# Patient Record
Sex: FEMALE | Race: WHITE | NOT HISPANIC OR LATINO | Employment: FULL TIME | URBAN - METROPOLITAN AREA
[De-identification: names, ages, dates, MRNs, and addresses within clinical notes are randomized per-mention and may not be internally consistent; named-entity substitution may affect disease eponyms.]

---

## 2024-05-10 ENCOUNTER — NON-PROVIDER VISIT (OUTPATIENT)
Dept: URGENT CARE | Facility: PHYSICIAN GROUP | Age: 29
End: 2024-05-10

## 2024-05-10 ENCOUNTER — APPOINTMENT (OUTPATIENT)
Dept: URGENT CARE | Facility: PHYSICIAN GROUP | Age: 29
End: 2024-05-10

## 2024-05-10 DIAGNOSIS — Z11.1 PPD SCREENING TEST: ICD-10-CM

## 2024-05-10 PROCEDURE — 86580 TB INTRADERMAL TEST: CPT | Performed by: NURSE PRACTITIONER

## 2024-05-11 NOTE — PROGRESS NOTES
Quoc Horan is a 28 y.o. female here for a non-provider visit for PPD placement -- Step 1 of 2    Reason for PPD:  work requirement    1. TB evaluation questionnaire completed by patient? Yes      -  If any answers marked yes did you contact a provider prior to placing? Not Indicated  2.  Patient notified to return to clinic for reading on: After Sunday (5-12-24) 5:48 PM and Before Monday (5-13-24) 5:48 PM.   3.  PPD Placement documentation completed on TB evaluation questionnaire? YES  4.  Location of TB evaluation questionnaire filed: [facility]

## 2024-05-13 ENCOUNTER — NON-PROVIDER VISIT (OUTPATIENT)
Dept: URGENT CARE | Facility: PHYSICIAN GROUP | Age: 29
End: 2024-05-13

## 2024-05-13 LAB — TB WHEAL 3D P 5 TU DIAM: NORMAL MM

## 2024-05-13 PROCEDURE — 99999 PR NO CHARGE: CPT | Performed by: NURSE PRACTITIONER

## 2024-05-14 NOTE — PROGRESS NOTES
Quoc Horan is a 28 y.o. female here for a non-provider visit for PPD reading -- Step 1 of 1.      1.  Resulted in Epic under enter/edit results? Yes   2.  TB evaluation questionnaire scanned into chart and original given to patient?Yes      3. Was induration greater than 0 mm? No.      Routed to PCP? No

## 2024-07-24 ENCOUNTER — APPOINTMENT (OUTPATIENT)
Dept: OBGYN | Facility: CLINIC | Age: 29
End: 2024-07-24
Payer: OTHER GOVERNMENT

## 2024-07-24 ENCOUNTER — HOSPITAL ENCOUNTER (OUTPATIENT)
Facility: MEDICAL CENTER | Age: 29
End: 2024-07-24
Attending: OBSTETRICS & GYNECOLOGY
Payer: OTHER GOVERNMENT

## 2024-07-24 VITALS — DIASTOLIC BLOOD PRESSURE: 65 MMHG | SYSTOLIC BLOOD PRESSURE: 118 MMHG | WEIGHT: 132.5 LBS

## 2024-07-24 DIAGNOSIS — Z32.00 ENCOUNTER FOR PREGNANCY TEST, RESULT UNKNOWN: Primary | ICD-10-CM

## 2024-07-24 DIAGNOSIS — Z34.00 SUPERVISION OF NORMAL FIRST PREGNANCY, ANTEPARTUM: ICD-10-CM

## 2024-07-24 DIAGNOSIS — Z32.00 ENCOUNTER FOR PREGNANCY TEST, RESULT UNKNOWN: ICD-10-CM

## 2024-07-24 LAB
POCT INT CON NEG: NEGATIVE
POCT INT CON POS: POSITIVE
POCT URINE PREGNANCY TEST: POSITIVE

## 2024-07-24 PROCEDURE — 87624 HPV HI-RISK TYP POOLED RSLT: CPT

## 2024-07-24 PROCEDURE — 87591 N.GONORRHOEAE DNA AMP PROB: CPT

## 2024-07-24 PROCEDURE — 76705 ECHO EXAM OF ABDOMEN: CPT | Mod: 26 | Performed by: OBSTETRICS & GYNECOLOGY

## 2024-07-24 PROCEDURE — 87491 CHLMYD TRACH DNA AMP PROBE: CPT

## 2024-07-24 PROCEDURE — 3074F SYST BP LT 130 MM HG: CPT | Performed by: OBSTETRICS & GYNECOLOGY

## 2024-07-24 PROCEDURE — 88175 CYTOPATH C/V AUTO FLUID REDO: CPT

## 2024-07-24 PROCEDURE — 81025 URINE PREGNANCY TEST: CPT | Performed by: OBSTETRICS & GYNECOLOGY

## 2024-07-24 PROCEDURE — 3078F DIAST BP <80 MM HG: CPT | Performed by: OBSTETRICS & GYNECOLOGY

## 2024-07-24 PROCEDURE — 0500F INITIAL PRENATAL CARE VISIT: CPT | Performed by: OBSTETRICS & GYNECOLOGY

## 2024-07-24 ASSESSMENT — EDINBURGH POSTNATAL DEPRESSION SCALE (EPDS)
I HAVE BEEN ANXIOUS OR WORRIED FOR NO GOOD REASON: HARDLY EVER
I HAVE LOOKED FORWARD WITH ENJOYMENT TO THINGS: AS MUCH AS I EVER DID
I HAVE BEEN SO UNHAPPY THAT I HAVE BEEN CRYING: NO, NEVER
I HAVE BLAMED MYSELF UNNECESSARILY WHEN THINGS WENT WRONG: NOT VERY OFTEN
I HAVE FELT SAD OR MISERABLE: NO, NOT AT ALL
THINGS HAVE BEEN GETTING ON TOP OF ME: NO, I HAVE BEEN COPING AS WELL AS EVER
TOTAL SCORE: 2
THE THOUGHT OF HARMING MYSELF HAS OCCURRED TO ME: NEVER
I HAVE FELT SCARED OR PANICKY FOR NO GOOD REASON: NO, NOT AT ALL
I HAVE BEEN SO UNHAPPY THAT I HAVE HAD DIFFICULTY SLEEPING: NOT AT ALL
I HAVE BEEN ABLE TO LAUGH AND SEE THE FUNNY SIDE OF THINGS: AS MUCH AS I ALWAYS COULD

## 2024-07-27 LAB
C TRACH RRNA CVX QL NAA+PROBE: NEGATIVE
CYTOLOGIST CVX/VAG CYTO: NORMAL
CYTOLOGY CVX/VAG DOC CYTO: NORMAL
CYTOLOGY CVX/VAG DOC THIN PREP: NORMAL
HPV I/H RISK 4 DNA CVX QL PROBE+SIG AMP: NEGATIVE
N GONORRHOEA RRNA CVX QL NAA+PROBE: NEGATIVE
NOTE NL11727A: NORMAL
OTHER STN SPEC: NORMAL
STAT OF ADQ CVX/VAG CYTO-IMP: NORMAL

## 2024-08-22 ENCOUNTER — TELEPHONE (OUTPATIENT)
Dept: OBGYN | Facility: CLINIC | Age: 29
End: 2024-08-22
Payer: OTHER GOVERNMENT

## 2024-08-22 NOTE — TELEPHONE ENCOUNTER
Phone Number Called: [phone]     Call outcome: Spoke to patient regarding message below.    Message: Spoke to pt and she states that she will have her labs from 7/24/24 completed prior to her visit on 8/26/24 with Dr. Díaz. Pt was unaware that she needed her labs completed. Thank you.

## 2024-08-24 ENCOUNTER — HOSPITAL ENCOUNTER (OUTPATIENT)
Dept: LAB | Facility: MEDICAL CENTER | Age: 29
End: 2024-08-24
Attending: OBSTETRICS & GYNECOLOGY
Payer: OTHER GOVERNMENT

## 2024-08-24 DIAGNOSIS — Z32.00 ENCOUNTER FOR PREGNANCY TEST, RESULT UNKNOWN: ICD-10-CM

## 2024-08-24 LAB
ABO GROUP BLD: NORMAL
APPEARANCE UR: CLEAR
BASOPHILS # BLD AUTO: 0.4 % (ref 0–1.8)
BASOPHILS # BLD: 0.05 K/UL (ref 0–0.12)
BILIRUB UR QL STRIP.AUTO: NEGATIVE
BLD GP AB SCN SERPL QL: NORMAL
COLOR UR: YELLOW
EOSINOPHIL # BLD AUTO: 0.09 K/UL (ref 0–0.51)
EOSINOPHIL NFR BLD: 0.7 % (ref 0–6.9)
ERYTHROCYTE [DISTWIDTH] IN BLOOD BY AUTOMATED COUNT: 43.5 FL (ref 35.9–50)
GLUCOSE UR STRIP.AUTO-MCNC: NEGATIVE MG/DL
HBV SURFACE AG SER QL: ABNORMAL
HCT VFR BLD AUTO: 43 % (ref 37–47)
HGB BLD-MCNC: 14.6 G/DL (ref 12–16)
HIV 1+2 AB+HIV1 P24 AG SERPL QL IA: NORMAL
IMM GRANULOCYTES # BLD AUTO: 0.09 K/UL (ref 0–0.11)
IMM GRANULOCYTES NFR BLD AUTO: 0.7 % (ref 0–0.9)
KETONES UR STRIP.AUTO-MCNC: ABNORMAL MG/DL
LEUKOCYTE ESTERASE UR QL STRIP.AUTO: NEGATIVE
LYMPHOCYTES # BLD AUTO: 1.51 K/UL (ref 1–4.8)
LYMPHOCYTES NFR BLD: 11.2 % (ref 22–41)
MCH RBC QN AUTO: 30.3 PG (ref 27–33)
MCHC RBC AUTO-ENTMCNC: 34 G/DL (ref 32.2–35.5)
MCV RBC AUTO: 89.2 FL (ref 81.4–97.8)
MICRO URNS: ABNORMAL
MONOCYTES # BLD AUTO: 0.65 K/UL (ref 0–0.85)
MONOCYTES NFR BLD AUTO: 4.8 % (ref 0–13.4)
NEUTROPHILS # BLD AUTO: 11.1 K/UL (ref 1.82–7.42)
NEUTROPHILS NFR BLD: 82.2 % (ref 44–72)
NITRITE UR QL STRIP.AUTO: NEGATIVE
NRBC # BLD AUTO: 0 K/UL
NRBC BLD-RTO: 0 /100 WBC (ref 0–0.2)
PH UR STRIP.AUTO: 8 [PH] (ref 5–8)
PLATELET # BLD AUTO: 319 K/UL (ref 164–446)
PMV BLD AUTO: 9.5 FL (ref 9–12.9)
PROT UR QL STRIP: NEGATIVE MG/DL
RBC # BLD AUTO: 4.82 M/UL (ref 4.2–5.4)
RBC UR QL AUTO: NEGATIVE
RH BLD: NORMAL
RUBV AB SER QL: 95.4 IU/ML
SP GR UR STRIP.AUTO: 1.02
T PALLIDUM AB SER QL IA: ABNORMAL
UROBILINOGEN UR STRIP.AUTO-MCNC: 0.2 MG/DL
WBC # BLD AUTO: 13.5 K/UL (ref 4.8–10.8)

## 2024-08-24 PROCEDURE — 36415 COLL VENOUS BLD VENIPUNCTURE: CPT

## 2024-08-24 PROCEDURE — 86762 RUBELLA ANTIBODY: CPT

## 2024-08-24 PROCEDURE — 86900 BLOOD TYPING SEROLOGIC ABO: CPT

## 2024-08-24 PROCEDURE — 86850 RBC ANTIBODY SCREEN: CPT

## 2024-08-24 PROCEDURE — 86592 SYPHILIS TEST NON-TREP QUAL: CPT

## 2024-08-24 PROCEDURE — 85025 COMPLETE CBC W/AUTO DIFF WBC: CPT

## 2024-08-24 PROCEDURE — 86901 BLOOD TYPING SEROLOGIC RH(D): CPT

## 2024-08-24 PROCEDURE — 80307 DRUG TEST PRSMV CHEM ANLYZR: CPT

## 2024-08-24 PROCEDURE — 81003 URINALYSIS AUTO W/O SCOPE: CPT

## 2024-08-24 PROCEDURE — 86780 TREPONEMA PALLIDUM: CPT

## 2024-08-24 PROCEDURE — 87340 HEPATITIS B SURFACE AG IA: CPT

## 2024-08-24 PROCEDURE — 87389 HIV-1 AG W/HIV-1&-2 AB AG IA: CPT

## 2024-08-26 ENCOUNTER — APPOINTMENT (OUTPATIENT)
Dept: OBGYN | Facility: CLINIC | Age: 29
End: 2024-08-26
Payer: OTHER GOVERNMENT

## 2024-08-26 VITALS — SYSTOLIC BLOOD PRESSURE: 157 MMHG | DIASTOLIC BLOOD PRESSURE: 86 MMHG | WEIGHT: 144.2 LBS

## 2024-08-26 DIAGNOSIS — Z34.92 SECOND TRIMESTER PREGNANCY: ICD-10-CM

## 2024-08-26 LAB
AMPHET CTO UR CFM-MCNC: NEGATIVE NG/ML
BARBITURATES CTO UR CFM-MCNC: NEGATIVE NG/ML
BENZODIAZ CTO UR CFM-MCNC: NEGATIVE NG/ML
CANNABINOIDS CTO UR CFM-MCNC: NEGATIVE NG/ML
COCAINE CTO UR CFM-MCNC: NEGATIVE NG/ML
CREAT UR-MCNC: 96.5 MG/DL (ref 20–400)
DRUG COMMENT 753798: NORMAL
METHADONE CTO UR CFM-MCNC: NEGATIVE NG/ML
OPIATES CTO UR CFM-MCNC: NEGATIVE NG/ML
PCP CTO UR CFM-MCNC: NEGATIVE NG/ML
PROPOXYPH CTO UR CFM-MCNC: NEGATIVE NG/ML

## 2024-08-26 PROCEDURE — 3077F SYST BP >= 140 MM HG: CPT | Performed by: OBSTETRICS & GYNECOLOGY

## 2024-08-26 PROCEDURE — 0502F SUBSEQUENT PRENATAL CARE: CPT | Performed by: OBSTETRICS & GYNECOLOGY

## 2024-08-26 PROCEDURE — 3079F DIAST BP 80-89 MM HG: CPT | Performed by: OBSTETRICS & GYNECOLOGY

## 2024-08-26 NOTE — PROGRESS NOTES
Pt's here for OB follow-up  Reports + fetal movement Not currently  No VB, LOF or UC's.  Confirmed good ph#[phone] ,   pharmacy, drug allergy, and medications on file.  Pt states no complaints for today.

## 2024-09-05 LAB
Lab: NEGATIVE
Lab: NORMAL
NTRA CYSTIC FIBROSIS: NEGATIVE
NTRA DUCHENNE/BECKER MUSCULAR DYSTROPHY: NEGATIVE
NTRA FRAGILE X SYNDROME: NEGATIVE
NTRA SPINAL MUSCULAR ATROPHY: NEGATIVE

## 2024-09-23 ENCOUNTER — APPOINTMENT (OUTPATIENT)
Dept: OBGYN | Facility: CLINIC | Age: 29
End: 2024-09-23
Payer: OTHER GOVERNMENT

## 2024-09-23 VITALS — DIASTOLIC BLOOD PRESSURE: 81 MMHG | WEIGHT: 151.1 LBS | SYSTOLIC BLOOD PRESSURE: 147 MMHG

## 2024-09-23 DIAGNOSIS — O10.919 CHRONIC HYPERTENSION AFFECTING PREGNANCY: ICD-10-CM

## 2024-09-23 PROCEDURE — 0502F SUBSEQUENT PRENATAL CARE: CPT | Performed by: OBSTETRICS & GYNECOLOGY

## 2024-09-23 PROCEDURE — 3077F SYST BP >= 140 MM HG: CPT | Performed by: OBSTETRICS & GYNECOLOGY

## 2024-09-23 PROCEDURE — 3079F DIAST BP 80-89 MM HG: CPT | Performed by: OBSTETRICS & GYNECOLOGY

## 2024-09-23 RX ORDER — ASPIRIN 81 MG/1
81 TABLET, CHEWABLE ORAL DAILY
Qty: 100 TABLET | Refills: 3 | Status: SHIPPED
Start: 2024-09-23 | End: 2024-09-23 | Stop reason: CLARIF

## 2024-09-23 NOTE — PROGRESS NOTES
Pt. Here for OB/FU.   22w1d  Reports Good FM.   Pt. Denies VB, LOF, or UC's.     Pt states no concerns for today.     Phone/Pharm verified.    [phone]

## 2024-09-24 ENCOUNTER — HOSPITAL ENCOUNTER (OUTPATIENT)
Dept: RADIOLOGY | Facility: MEDICAL CENTER | Age: 29
End: 2024-09-24
Attending: OBSTETRICS & GYNECOLOGY
Payer: OTHER GOVERNMENT

## 2024-09-24 DIAGNOSIS — Z32.00 ENCOUNTER FOR PREGNANCY TEST, RESULT UNKNOWN: ICD-10-CM

## 2024-09-24 PROCEDURE — 76805 OB US >/= 14 WKS SNGL FETUS: CPT

## 2024-10-24 ENCOUNTER — ROUTINE PRENATAL (OUTPATIENT)
Dept: OBGYN | Facility: CLINIC | Age: 29
End: 2024-10-24
Payer: OTHER GOVERNMENT

## 2024-10-24 VITALS — DIASTOLIC BLOOD PRESSURE: 78 MMHG | SYSTOLIC BLOOD PRESSURE: 140 MMHG | WEIGHT: 157 LBS

## 2024-10-24 DIAGNOSIS — R03.0 ELEVATED BLOOD PRESSURE READING IN OFFICE WITH WHITE COAT SYNDROME, WITHOUT DIAGNOSIS OF HYPERTENSION: ICD-10-CM

## 2024-10-24 DIAGNOSIS — Z34.00 SUPERVISION OF NORMAL FIRST PREGNANCY, ANTEPARTUM: ICD-10-CM

## 2024-10-24 DIAGNOSIS — R03.0 ELEVATED BLOOD PRESSURE READING IN OFFICE WITHOUT DIAGNOSIS OF HYPERTENSION: ICD-10-CM

## 2024-10-24 PROCEDURE — 3078F DIAST BP <80 MM HG: CPT | Performed by: MIDWIFE

## 2024-10-24 PROCEDURE — 0502F SUBSEQUENT PRENATAL CARE: CPT | Performed by: MIDWIFE

## 2024-10-24 PROCEDURE — 3077F SYST BP >= 140 MM HG: CPT | Performed by: MIDWIFE

## 2024-10-29 ENCOUNTER — HOSPITAL ENCOUNTER (OUTPATIENT)
Dept: RADIOLOGY | Facility: MEDICAL CENTER | Age: 29
End: 2024-10-29
Attending: MIDWIFE
Payer: OTHER GOVERNMENT

## 2024-10-29 DIAGNOSIS — Z34.00 SUPERVISION OF NORMAL FIRST PREGNANCY, ANTEPARTUM: ICD-10-CM

## 2024-10-29 DIAGNOSIS — R03.0 ELEVATED BLOOD PRESSURE READING IN OFFICE WITH WHITE COAT SYNDROME, WITHOUT DIAGNOSIS OF HYPERTENSION: ICD-10-CM

## 2024-10-29 PROCEDURE — 76816 OB US FOLLOW-UP PER FETUS: CPT

## 2024-11-02 ENCOUNTER — HOSPITAL ENCOUNTER (OUTPATIENT)
Dept: LAB | Facility: MEDICAL CENTER | Age: 29
End: 2024-11-02
Attending: MIDWIFE
Payer: OTHER GOVERNMENT

## 2024-11-02 DIAGNOSIS — R03.0 ELEVATED BLOOD PRESSURE READING IN OFFICE WITH WHITE COAT SYNDROME, WITHOUT DIAGNOSIS OF HYPERTENSION: ICD-10-CM

## 2024-11-02 DIAGNOSIS — Z34.00 SUPERVISION OF NORMAL FIRST PREGNANCY, ANTEPARTUM: ICD-10-CM

## 2024-11-02 LAB
ALBUMIN SERPL BCP-MCNC: 3.3 G/DL (ref 3.2–4.9)
ALBUMIN/GLOB SERPL: 1 G/DL
ALP SERPL-CCNC: 101 U/L (ref 30–99)
ALT SERPL-CCNC: 16 U/L (ref 2–50)
ANION GAP SERPL CALC-SCNC: 12 MMOL/L (ref 7–16)
AST SERPL-CCNC: 18 U/L (ref 12–45)
BILIRUB SERPL-MCNC: 0.2 MG/DL (ref 0.1–1.5)
BUN SERPL-MCNC: 6 MG/DL (ref 8–22)
CALCIUM ALBUM COR SERPL-MCNC: 9.4 MG/DL (ref 8.5–10.5)
CALCIUM SERPL-MCNC: 8.8 MG/DL (ref 8.5–10.5)
CHLORIDE SERPL-SCNC: 103 MMOL/L (ref 96–112)
CO2 SERPL-SCNC: 20 MMOL/L (ref 20–33)
CREAT SERPL-MCNC: 0.42 MG/DL (ref 0.5–1.4)
CREAT UR-MCNC: 64.75 MG/DL
ERYTHROCYTE [DISTWIDTH] IN BLOOD BY AUTOMATED COUNT: 41.1 FL (ref 35.9–50)
GFR SERPLBLD CREATININE-BSD FMLA CKD-EPI: 135 ML/MIN/1.73 M 2
GLOBULIN SER CALC-MCNC: 3.4 G/DL (ref 1.9–3.5)
GLUCOSE 1H P 50 G GLC PO SERPL-MCNC: 127 MG/DL (ref 70–139)
GLUCOSE SERPL-MCNC: 86 MG/DL (ref 65–99)
HCT VFR BLD AUTO: 37.6 % (ref 37–47)
HGB BLD-MCNC: 12.6 G/DL (ref 12–16)
MCH RBC QN AUTO: 29.9 PG (ref 27–33)
MCHC RBC AUTO-ENTMCNC: 33.5 G/DL (ref 32.2–35.5)
MCV RBC AUTO: 89.3 FL (ref 81.4–97.8)
PLATELET # BLD AUTO: 270 K/UL (ref 164–446)
PMV BLD AUTO: 9.8 FL (ref 9–12.9)
POTASSIUM SERPL-SCNC: 3.7 MMOL/L (ref 3.6–5.5)
PROT SERPL-MCNC: 6.7 G/DL (ref 6–8.2)
PROT UR-MCNC: 8 MG/DL (ref 0–15)
PROT/CREAT UR: 124 MG/G (ref 10–107)
RBC # BLD AUTO: 4.21 M/UL (ref 4.2–5.4)
SODIUM SERPL-SCNC: 135 MMOL/L (ref 135–145)
T PALLIDUM AB SER QL IA: NORMAL
WBC # BLD AUTO: 11 K/UL (ref 4.8–10.8)

## 2024-11-02 PROCEDURE — 82950 GLUCOSE TEST: CPT

## 2024-11-02 PROCEDURE — 85027 COMPLETE CBC AUTOMATED: CPT

## 2024-11-02 PROCEDURE — 82570 ASSAY OF URINE CREATININE: CPT

## 2024-11-02 PROCEDURE — 36415 COLL VENOUS BLD VENIPUNCTURE: CPT

## 2024-11-02 PROCEDURE — 86780 TREPONEMA PALLIDUM: CPT

## 2024-11-02 PROCEDURE — 80053 COMPREHEN METABOLIC PANEL: CPT

## 2024-11-02 PROCEDURE — 84156 ASSAY OF PROTEIN URINE: CPT

## 2024-11-08 ENCOUNTER — ROUTINE PRENATAL (OUTPATIENT)
Dept: OBGYN | Facility: CLINIC | Age: 29
End: 2024-11-08
Payer: OTHER GOVERNMENT

## 2024-11-08 VITALS — SYSTOLIC BLOOD PRESSURE: 153 MMHG | WEIGHT: 160.9 LBS | DIASTOLIC BLOOD PRESSURE: 93 MMHG

## 2024-11-08 DIAGNOSIS — R03.0 ELEVATED BLOOD PRESSURE READING IN OFFICE WITHOUT DIAGNOSIS OF HYPERTENSION: ICD-10-CM

## 2024-11-08 DIAGNOSIS — Z34.00 SUPERVISION OF NORMAL FIRST PREGNANCY, ANTEPARTUM: ICD-10-CM

## 2024-11-08 PROCEDURE — 3080F DIAST BP >= 90 MM HG: CPT | Performed by: STUDENT IN AN ORGANIZED HEALTH CARE EDUCATION/TRAINING PROGRAM

## 2024-11-08 PROCEDURE — 0502F SUBSEQUENT PRENATAL CARE: CPT | Performed by: STUDENT IN AN ORGANIZED HEALTH CARE EDUCATION/TRAINING PROGRAM

## 2024-11-08 PROCEDURE — 3077F SYST BP >= 140 MM HG: CPT | Performed by: STUDENT IN AN ORGANIZED HEALTH CARE EDUCATION/TRAINING PROGRAM

## 2024-11-08 ASSESSMENT — FIBROSIS 4 INDEX: FIB4 SCORE: 0.48

## 2024-11-08 NOTE — LETTER
"Count Your Baby's Movements  Another step to a healthy delivery    Quoc Horan  [facility]  Dept: [phone]    How Many Weeks Pregnant? 28w4d    Date to Begin Countin2024              How to use this chart    One way for your physician to keep track of your baby's health is by knowing how often the baby moves (or \"kicks\") in your womb.  You can help your physician to do this by using this chart every day.    Every day, you should see how many hours it takes for your baby to move 10 times.  Start in the morning, as soon as you get up.    First, write down the time your baby moves until you get to 10.  Check off one box every time your baby moves until you get to 10.  Write down the time you finished counting in the last column.  Total how long it took to count up all 10 movements.  Finally, fill in the box that shows how long this took.  After counting 10 movements, you no longer have to count any more that day.  The next morning, just start counting again as soon as you get up.    What should you call a \"movement\"?  It is hard to say, because it will feel different from one mother to another and from one pregnancy to the next.  The important thing is that you count the movements the same way throughout your pregnancy.  If you have more questions, you should ask your physician.    Count carefully every day!  SAMPLE:  Week 28    How many hours did it take to feel 10 movements?       Start  Time     1     2     3     4     5     6     7     8     9     10   Finish Time   Mon 8:20           11:40                  Sat               Sun                 IMPORTANT: You should contact your physician if it takes more than two hours for you to feel 10 movements.  Each morning, write down the time and start to count the movements of your baby.  Keep track by checking off one box every time you feel one movement.  When you " "have felt 10 \"kicks\", write down the time you finished counting in the last column.  Then fill in the   box (over the check kenneth) for the number of hours it took.  Be sure to read the complete instructions on the previous page.            "

## 2024-11-08 NOTE — PROGRESS NOTES
Pt here for OB follow-up  Last seen on: 10/24/2024 Claudia (elevated Bp)  +FM  No VB, LOF, UC  Phone:[phone]   Pharmacy verified   Rh type: B+  Pt states just had question about GBS swab.. I explained to her what it was.   Has been taking Bp at home. Has a few 130/70 but no higher than 133/77.     Genetic testing results: negative   Tdap: ask at next visit   FLU: declined  REG: given with instructions   BTL: declined

## 2024-11-08 NOTE — ASSESSMENT & PLAN NOTE
Pt is a 29 y.o.  at 28w5d gestation here today for routine prenatal care.   Size equal to dates    Discuss  labor and pre-eclampsia precautions.  Discuss FMA and FKCs  Address concerns: none  GBS Not yet collected  Rh positive  Tdap: Considering at next visit  Reviewed 3rd tri labs: wnl  Labs ordered: none  Imaging ordered: none  Declines for contraception postpartum.   RTC 2 wks.

## 2024-11-08 NOTE — PROGRESS NOTES
[facility]  OB Follow-up    S: Pt is a 29 y.o.  at 28w5d gestation here today for routine prenatal care.     Concerns today include:  Pt checking Bps twice daily since last visit. AM Bps 116-133/64-78 and PM Bps 121-133/62-77. Denies symptoms of hypertension.     Denies: vaginal bleeding, pelvic and abdominal pain, cramping, contractions, leaking of fluid, urinary and vaginal symptoms and headaches, visual changes, epigastric pain    Pt reports fetal movement as Present     O: BP (!) 153/93   Wt 160 lb 14.4 oz   LMP 2024    *see prenatal flowsheet*     Labs:  Prenatal labs: Completed and normal  GCT: not yet collected   GBS: Not yet collected  Genetic testing: NIPT low risk  STI testing: negative    Ultrasound:  Dated by LMP c/w 13w1d US  24: Normal fetal anatomy and growth. Anterior placenta no previa. CL 3.89 cm  10/29/24: EFW 1298g (92%), PRABHU 20    Vaccinations:  Flu: declined  Tdap: considering at next visit  COVID: declined  RSV: 32-36wks    A/P:     Problem List Items Addressed This Visit          Women's Health Problems    Supervision of normal first pregnancy, antepartum     Pt is a 29 y.o.  at 28w5d gestation here today for routine prenatal care.   Size equal to dates    Discuss  labor and pre-eclampsia precautions.  Discuss FMA and FKCs  Address concerns: none  GBS Not yet collected  Rh positive  Tdap: Considering at next visit  Reviewed 3rd tri labs: wnl  Labs ordered: none  Imaging ordered: none  Declines for contraception postpartum.   RTC 2 wks.            Other    Elevated blood pressure reading in office without diagnosis of hypertension: CHTN vs white coat syndrome     Monitored BP twice daily since last visit and all values wnl.  Pre-e precautions and BP parameters given  Mild range BP in clinic again today. Given pt's Bps on home cuff all normal, recommended patient bring cuff to next visit to calibrate against clinic cuff.          Shantel Richardson M.D.

## 2024-11-08 NOTE — ASSESSMENT & PLAN NOTE
Monitored BP twice daily since last visit and all values wnl.  Pre-e precautions and BP parameters given  Mild range BP in clinic again today. Given pt's Bps on home cuff all normal, recommended patient bring cuff to next visit to calibrate against clinic cuff.

## 2024-11-18 NOTE — ASSESSMENT & PLAN NOTE
Pt is a 29 y.o.  at 30w2d gestation here today for routine prenatal care.      Size equal to dates  Discuss  labor and pre-eclampsia precautions.  Discuss FMA and FKCs  Address concerns: none  GBS Not yet collected  Rh positive  Tdap: today  Reviewed 3rd tri labs: wnl  Labs ordered: none  Imaging ordered: none  Declines for contraception postpartum.   RTC 2 wks.

## 2024-11-18 NOTE — ASSESSMENT & PLAN NOTE
Pt with multiple mild range Bps since NOB visit. Monitoring Bps at home and all values wnl.  Baseline PI labs wnl, P/C 124  Pre-e precautions and BP parameters given  Mild range BP in clinic again today. Pt brought in her home cuff, and BP c/w clinic cuff increasing suspicion for white coat HTN, however given number of elevated Bps will treat as chronic hypertensive.  Pt with allergy to aspirin  Growth US q4wks, weekly NST starting at 32wks

## 2024-11-18 NOTE — PROGRESS NOTES
[facility]  OB Follow-up    S: Pt is a 29 y.o.  at 30w2d gestation here today for routine prenatal care.     She continues to monitor her blood pressures at home:  : 124/73  : 128/79  : 126/70  : 123/74    Denies: vaginal bleeding, pelvic and abdominal pain, cramping, contractions, leaking of fluid, urinary and vaginal symptoms and headaches, visual changes, epigastric pain    Pt reports fetal movement as Present     O: BP (!) 137/93   Wt 163 lb   LMP 2024    *see prenatal flowsheet*     Labs:  Prenatal labs: Completed and normal  GCT: 127  GBS: Not yet collected  Genetic testing: NIPT low risk  STI testing: negative    Ultrasound:  Dated by LMP c/w 13w1d US  24: Normal fetal anatomy and growth. Anterior placenta no previa. CL 3.89 cm  10/29/24: EFW 1298g (92%), PRABHU 20    Vaccinations:  Flu: declined  Tdap: today  COVID: declined  RSV: 32-36wks    A/P:     Problem List Items Addressed This Visit          Women's Health Problems    Supervision of normal first pregnancy, antepartum - Primary     Pt is a 29 y.o.  at 30w2d gestation here today for routine prenatal care.      Size equal to dates  Discuss  labor and pre-eclampsia precautions.  Discuss FMA and FKCs  Address concerns: none  GBS Not yet collected  Rh positive  Tdap: today  Reviewed 3rd tri labs: wnl  Labs ordered: none  Imaging ordered: none  Declines for contraception postpartum.   RTC 2 wks.         Relevant Orders    Tdap Vaccine =>8YO IM       Other    Elevated blood pressure reading in office without diagnosis of hypertension: CHTN vs white coat syndrome     Pt with multiple mild range Bps since NOB visit. Monitoring Bps at home and all values wnl.  Baseline PIH labs wnl, P/C 124  Pre-e precautions and BP parameters given  Mild range BP in clinic again today. Pt brought in her home cuff, and BP c/w clinic cuff increasing suspicion for white coat HTN, however given number  of elevated Bps will treat as chronic hypertensive.  Pt with allergy to aspirin  Growth US q4wks, weekly NST starting at 32wks           Shantel Richardson M.D.

## 2024-11-19 ENCOUNTER — ROUTINE PRENATAL (OUTPATIENT)
Dept: OBGYN | Facility: CLINIC | Age: 29
End: 2024-11-19
Payer: OTHER GOVERNMENT

## 2024-11-19 VITALS — SYSTOLIC BLOOD PRESSURE: 137 MMHG | DIASTOLIC BLOOD PRESSURE: 93 MMHG | WEIGHT: 163 LBS

## 2024-11-19 DIAGNOSIS — Z34.00 SUPERVISION OF NORMAL FIRST PREGNANCY, ANTEPARTUM: Primary | ICD-10-CM

## 2024-11-19 DIAGNOSIS — R03.0 ELEVATED BLOOD PRESSURE READING IN OFFICE WITHOUT DIAGNOSIS OF HYPERTENSION: ICD-10-CM

## 2024-11-19 PROCEDURE — 90471 IMMUNIZATION ADMIN: CPT | Performed by: STUDENT IN AN ORGANIZED HEALTH CARE EDUCATION/TRAINING PROGRAM

## 2024-11-19 PROCEDURE — 0502F SUBSEQUENT PRENATAL CARE: CPT | Performed by: STUDENT IN AN ORGANIZED HEALTH CARE EDUCATION/TRAINING PROGRAM

## 2024-11-19 PROCEDURE — 90715 TDAP VACCINE 7 YRS/> IM: CPT | Mod: JZ | Performed by: STUDENT IN AN ORGANIZED HEALTH CARE EDUCATION/TRAINING PROGRAM

## 2024-11-19 ASSESSMENT — FIBROSIS 4 INDEX: FIB4 SCORE: 0.48

## 2024-11-19 NOTE — PROGRESS NOTES
OB follow up   + fetal movement.  No VB, LOF or UC's.  Phone # [phone]   Preferred pharmacy confirmed.  Flu vaccine offered and declined

## 2024-12-04 ENCOUNTER — ROUTINE PRENATAL (OUTPATIENT)
Dept: OBGYN | Facility: CLINIC | Age: 29
End: 2024-12-04
Payer: OTHER GOVERNMENT

## 2024-12-04 ENCOUNTER — HOSPITAL ENCOUNTER (INPATIENT)
Facility: MEDICAL CENTER | Age: 29
LOS: 5 days | End: 2024-12-09
Attending: OBSTETRICS & GYNECOLOGY | Admitting: FAMILY MEDICINE
Payer: OTHER GOVERNMENT

## 2024-12-04 VITALS
SYSTOLIC BLOOD PRESSURE: 172 MMHG | DIASTOLIC BLOOD PRESSURE: 102 MMHG | WEIGHT: 167.2 LBS | HEIGHT: 61 IN | BODY MASS INDEX: 31.57 KG/M2

## 2024-12-04 DIAGNOSIS — O14.93 PRE-ECLAMPSIA DURING PREGNANCY IN THIRD TRIMESTER, ANTEPARTUM: ICD-10-CM

## 2024-12-04 DIAGNOSIS — O10.919 CHRONIC HYPERTENSION AFFECTING PREGNANCY: ICD-10-CM

## 2024-12-04 DIAGNOSIS — Z98.891 HISTORY OF CESAREAN DELIVERY: ICD-10-CM

## 2024-12-04 DIAGNOSIS — Z34.00 SUPERVISION OF NORMAL FIRST PREGNANCY, ANTEPARTUM: ICD-10-CM

## 2024-12-04 PROBLEM — Z34.90 ENCOUNTER FOR INDUCTION OF LABOR: Status: ACTIVE | Noted: 2024-12-04

## 2024-12-04 LAB
ALBUMIN SERPL BCP-MCNC: 3.6 G/DL (ref 3.2–4.9)
ALBUMIN/GLOB SERPL: 1.1 G/DL
ALP SERPL-CCNC: 183 U/L (ref 30–99)
ALT SERPL-CCNC: 19 U/L (ref 2–50)
ANION GAP SERPL CALC-SCNC: 12 MMOL/L (ref 7–16)
AST SERPL-CCNC: 23 U/L (ref 12–45)
BILIRUB SERPL-MCNC: 0.2 MG/DL (ref 0.1–1.5)
BUN SERPL-MCNC: 9 MG/DL (ref 8–22)
CALCIUM ALBUM COR SERPL-MCNC: 9.9 MG/DL (ref 8.5–10.5)
CALCIUM SERPL-MCNC: 9.6 MG/DL (ref 8.5–10.5)
CHLORIDE SERPL-SCNC: 104 MMOL/L (ref 96–112)
CO2 SERPL-SCNC: 20 MMOL/L (ref 20–33)
CREAT SERPL-MCNC: 0.49 MG/DL (ref 0.5–1.4)
CREAT UR-MCNC: 48.9 MG/DL
ERYTHROCYTE [DISTWIDTH] IN BLOOD BY AUTOMATED COUNT: 38 FL (ref 35.9–50)
GFR SERPLBLD CREATININE-BSD FMLA CKD-EPI: 130 ML/MIN/1.73 M 2
GLOBULIN SER CALC-MCNC: 3.2 G/DL (ref 1.9–3.5)
GLUCOSE SERPL-MCNC: 101 MG/DL (ref 65–99)
HCT VFR BLD AUTO: 37.7 % (ref 37–47)
HGB BLD-MCNC: 13 G/DL (ref 12–16)
HOLDING TUBE BB 8507: NORMAL
MAGNESIUM SERPL-MCNC: 4.1 MG/DL (ref 1.5–2.5)
MAGNESIUM SERPL-MCNC: 5.3 MG/DL (ref 1.5–2.5)
MCH RBC QN AUTO: 29.6 PG (ref 27–33)
MCHC RBC AUTO-ENTMCNC: 34.5 G/DL (ref 32.2–35.5)
MCV RBC AUTO: 85.9 FL (ref 81.4–97.8)
PLATELET # BLD AUTO: 243 K/UL (ref 164–446)
PMV BLD AUTO: 10.4 FL (ref 9–12.9)
POTASSIUM SERPL-SCNC: 4 MMOL/L (ref 3.6–5.5)
PROT SERPL-MCNC: 6.8 G/DL (ref 6–8.2)
PROT UR-MCNC: 18.5 MG/DL (ref 0–15)
PROT/CREAT UR: 378 MG/G (ref 10–107)
RBC # BLD AUTO: 4.39 M/UL (ref 4.2–5.4)
SODIUM SERPL-SCNC: 136 MMOL/L (ref 135–145)
T PALLIDUM AB SER QL IA: NORMAL
URATE SERPL-MCNC: 4.3 MG/DL (ref 1.9–8.2)
WBC # BLD AUTO: 13.2 K/UL (ref 4.8–10.8)

## 2024-12-04 PROCEDURE — A9270 NON-COVERED ITEM OR SERVICE: HCPCS | Performed by: FAMILY MEDICINE

## 2024-12-04 PROCEDURE — 36415 COLL VENOUS BLD VENIPUNCTURE: CPT

## 2024-12-04 PROCEDURE — 84156 ASSAY OF PROTEIN URINE: CPT

## 2024-12-04 PROCEDURE — 0502F SUBSEQUENT PRENATAL CARE: CPT | Performed by: OBSTETRICS & GYNECOLOGY

## 2024-12-04 PROCEDURE — 770002 HCHG ROOM/CARE - OB PRIVATE (112)

## 2024-12-04 PROCEDURE — 700111 HCHG RX REV CODE 636 W/ 250 OVERRIDE (IP): Performed by: FAMILY MEDICINE

## 2024-12-04 PROCEDURE — 59025 FETAL NON-STRESS TEST: CPT

## 2024-12-04 PROCEDURE — 700102 HCHG RX REV CODE 250 W/ 637 OVERRIDE(OP)

## 2024-12-04 PROCEDURE — 700102 HCHG RX REV CODE 250 W/ 637 OVERRIDE(OP): Performed by: OBSTETRICS & GYNECOLOGY

## 2024-12-04 PROCEDURE — 80053 COMPREHEN METABOLIC PANEL: CPT

## 2024-12-04 PROCEDURE — 700102 HCHG RX REV CODE 250 W/ 637 OVERRIDE(OP): Performed by: FAMILY MEDICINE

## 2024-12-04 PROCEDURE — 3080F DIAST BP >= 90 MM HG: CPT | Performed by: OBSTETRICS & GYNECOLOGY

## 2024-12-04 PROCEDURE — 86780 TREPONEMA PALLIDUM: CPT

## 2024-12-04 PROCEDURE — A9270 NON-COVERED ITEM OR SERVICE: HCPCS

## 2024-12-04 PROCEDURE — 700111 HCHG RX REV CODE 636 W/ 250 OVERRIDE (IP): Mod: JZ | Performed by: OBSTETRICS & GYNECOLOGY

## 2024-12-04 PROCEDURE — 3E0P7VZ INTRODUCTION OF HORMONE INTO FEMALE REPRODUCTIVE, VIA NATURAL OR ARTIFICIAL OPENING: ICD-10-PCS | Performed by: FAMILY MEDICINE

## 2024-12-04 PROCEDURE — A9270 NON-COVERED ITEM OR SERVICE: HCPCS | Performed by: OBSTETRICS & GYNECOLOGY

## 2024-12-04 PROCEDURE — 84550 ASSAY OF BLOOD/URIC ACID: CPT

## 2024-12-04 PROCEDURE — 82570 ASSAY OF URINE CREATININE: CPT

## 2024-12-04 PROCEDURE — 700105 HCHG RX REV CODE 258: Performed by: FAMILY MEDICINE

## 2024-12-04 PROCEDURE — 3077F SYST BP >= 140 MM HG: CPT | Performed by: OBSTETRICS & GYNECOLOGY

## 2024-12-04 PROCEDURE — 85027 COMPLETE CBC AUTOMATED: CPT

## 2024-12-04 PROCEDURE — 83735 ASSAY OF MAGNESIUM: CPT

## 2024-12-04 PROCEDURE — 99285 EMERGENCY DEPT VISIT HI MDM: CPT

## 2024-12-04 PROCEDURE — 81002 URINALYSIS NONAUTO W/O SCOPE: CPT

## 2024-12-04 RX ORDER — ONDANSETRON 4 MG/1
4 TABLET, ORALLY DISINTEGRATING ORAL EVERY 6 HOURS PRN
Status: DISCONTINUED | OUTPATIENT
Start: 2024-12-04 | End: 2024-12-06

## 2024-12-04 RX ORDER — CALCIUM GLUCONATE 94 MG/ML
1 INJECTION, SOLUTION INTRAVENOUS
Status: DISCONTINUED | OUTPATIENT
Start: 2024-12-04 | End: 2024-12-06

## 2024-12-04 RX ORDER — SODIUM CHLORIDE, SODIUM LACTATE, POTASSIUM CHLORIDE, CALCIUM CHLORIDE 600; 310; 30; 20 MG/100ML; MG/100ML; MG/100ML; MG/100ML
INJECTION, SOLUTION INTRAVENOUS CONTINUOUS
Status: DISCONTINUED | OUTPATIENT
Start: 2024-12-04 | End: 2024-12-09 | Stop reason: HOSPADM

## 2024-12-04 RX ORDER — MAGNESIUM SULFATE HEPTAHYDRATE 40 MG/ML
INJECTION, SOLUTION INTRAVENOUS
Status: ACTIVE
Start: 2024-12-04 | End: 2024-12-05

## 2024-12-04 RX ORDER — MAGNESIUM SULFATE HEPTAHYDRATE 40 MG/ML
2 INJECTION, SOLUTION INTRAVENOUS CONTINUOUS
Status: DISCONTINUED | OUTPATIENT
Start: 2024-12-04 | End: 2024-12-05

## 2024-12-04 RX ORDER — HYDROXYZINE HYDROCHLORIDE 50 MG/1
50 TABLET, FILM COATED ORAL EVERY 6 HOURS PRN
Status: DISCONTINUED | OUTPATIENT
Start: 2024-12-04 | End: 2024-12-06

## 2024-12-04 RX ORDER — ACETAMINOPHEN 500 MG
1000 TABLET ORAL
Status: DISCONTINUED | OUTPATIENT
Start: 2024-12-04 | End: 2024-12-06

## 2024-12-04 RX ORDER — HYDRALAZINE HYDROCHLORIDE 20 MG/ML
5-10 INJECTION INTRAMUSCULAR; INTRAVENOUS
Status: DISCONTINUED | OUTPATIENT
Start: 2024-12-04 | End: 2024-12-06

## 2024-12-04 RX ORDER — NIFEDIPINE 10 MG/1
10 CAPSULE ORAL
Status: DISCONTINUED | OUTPATIENT
Start: 2024-12-04 | End: 2024-12-06

## 2024-12-04 RX ORDER — OXYTOCIN 10 [USP'U]/ML
10 INJECTION, SOLUTION INTRAMUSCULAR; INTRAVENOUS
Status: DISCONTINUED | OUTPATIENT
Start: 2024-12-04 | End: 2024-12-06

## 2024-12-04 RX ORDER — ALUMINA, MAGNESIA, AND SIMETHICONE 2400; 2400; 240 MG/30ML; MG/30ML; MG/30ML
30 SUSPENSION ORAL EVERY 6 HOURS PRN
Status: DISCONTINUED | OUTPATIENT
Start: 2024-12-04 | End: 2024-12-06

## 2024-12-04 RX ORDER — BETAMETHASONE SODIUM PHOSPHATE AND BETAMETHASONE ACETATE 3; 3 MG/ML; MG/ML
12 INJECTION, SUSPENSION INTRA-ARTICULAR; INTRALESIONAL; INTRAMUSCULAR; SOFT TISSUE EVERY 24 HOURS
Status: COMPLETED | OUTPATIENT
Start: 2024-12-04 | End: 2024-12-05

## 2024-12-04 RX ORDER — MAGNESIUM SULFATE HEPTAHYDRATE 40 MG/ML
4 INJECTION, SOLUTION INTRAVENOUS ONCE
Status: COMPLETED | OUTPATIENT
Start: 2024-12-04 | End: 2024-12-04

## 2024-12-04 RX ORDER — ONDANSETRON 2 MG/ML
4 INJECTION INTRAMUSCULAR; INTRAVENOUS EVERY 6 HOURS PRN
Status: DISCONTINUED | OUTPATIENT
Start: 2024-12-04 | End: 2024-12-06

## 2024-12-04 RX ORDER — LABETALOL HYDROCHLORIDE 5 MG/ML
20-80 INJECTION, SOLUTION INTRAVENOUS
Status: DISCONTINUED | OUTPATIENT
Start: 2024-12-04 | End: 2024-12-06

## 2024-12-04 RX ORDER — TERBUTALINE SULFATE 1 MG/ML
0.25 INJECTION, SOLUTION SUBCUTANEOUS
Status: COMPLETED | OUTPATIENT
Start: 2024-12-04 | End: 2024-12-06

## 2024-12-04 RX ORDER — LIDOCAINE HYDROCHLORIDE 10 MG/ML
20 INJECTION, SOLUTION INFILTRATION; PERINEURAL
Status: DISCONTINUED | OUTPATIENT
Start: 2024-12-04 | End: 2024-12-06

## 2024-12-04 RX ORDER — ONDANSETRON 2 MG/ML
4 INJECTION INTRAMUSCULAR; INTRAVENOUS EVERY 4 HOURS PRN
Status: DISCONTINUED | OUTPATIENT
Start: 2024-12-04 | End: 2024-12-04

## 2024-12-04 RX ADMIN — NIFEDIPINE 10 MG: 10 CAPSULE ORAL at 11:39

## 2024-12-04 RX ADMIN — SODIUM CHLORIDE, POTASSIUM CHLORIDE, SODIUM LACTATE AND CALCIUM CHLORIDE: 600; 310; 30; 20 INJECTION, SOLUTION INTRAVENOUS at 12:44

## 2024-12-04 RX ADMIN — BETAMETHASONE SODIUM PHOSPHATE AND BETAMETHASONE ACETATE 12 MG: 3; 3 INJECTION, SUSPENSION INTRA-ARTICULAR; INTRALESIONAL; INTRAMUSCULAR at 13:35

## 2024-12-04 RX ADMIN — HYDRALAZINE HYDROCHLORIDE 10 MG: 20 INJECTION INTRAMUSCULAR; INTRAVENOUS at 12:53

## 2024-12-04 RX ADMIN — MAGNESIUM SULFATE IN WATER 2 G/HR: 40 INJECTION, SOLUTION INTRAVENOUS at 13:36

## 2024-12-04 RX ADMIN — HYDRALAZINE HYDROCHLORIDE 5 MG: 20 INJECTION INTRAMUSCULAR; INTRAVENOUS at 12:35

## 2024-12-04 RX ADMIN — SODIUM CHLORIDE, POTASSIUM CHLORIDE, SODIUM LACTATE AND CALCIUM CHLORIDE: 600; 310; 30; 20 INJECTION, SOLUTION INTRAVENOUS at 13:35

## 2024-12-04 RX ADMIN — MAGNESIUM SULFATE HEPTAHYDRATE 4 G: 4 INJECTION, SOLUTION INTRAVENOUS at 12:45

## 2024-12-04 RX ADMIN — MISOPROSTOL 25 MCG: 100 TABLET ORAL at 18:38

## 2024-12-04 RX ADMIN — MISOPROSTOL 25 MCG: 100 TABLET ORAL at 14:15

## 2024-12-04 RX ADMIN — MAGNESIUM SULFATE IN WATER 2 G/HR: 40 INJECTION, SOLUTION INTRAVENOUS at 13:09

## 2024-12-04 RX ADMIN — ONDANSETRON 4 MG: 2 INJECTION INTRAMUSCULAR; INTRAVENOUS at 13:12

## 2024-12-04 RX ADMIN — MISOPROSTOL 25 MCG: 100 TABLET ORAL at 23:44

## 2024-12-04 RX ADMIN — NIFEDIPINE 10 MG: 10 CAPSULE ORAL at 10:42

## 2024-12-04 SDOH — ECONOMIC STABILITY: TRANSPORTATION INSECURITY
IN THE PAST 12 MONTHS, HAS LACK OF RELIABLE TRANSPORTATION KEPT YOU FROM MEDICAL APPOINTMENTS, MEETINGS, WORK OR FROM GETTING THINGS NEEDED FOR DAILY LIVING?: NO

## 2024-12-04 SDOH — ECONOMIC STABILITY: TRANSPORTATION INSECURITY
IN THE PAST 12 MONTHS, HAS THE LACK OF TRANSPORTATION KEPT YOU FROM MEDICAL APPOINTMENTS OR FROM GETTING MEDICATIONS?: NO

## 2024-12-04 ASSESSMENT — SOCIAL DETERMINANTS OF HEALTH (SDOH)
WITHIN THE LAST YEAR, HAVE YOU BEEN HUMILIATED OR EMOTIONALLY ABUSED IN OTHER WAYS BY YOUR PARTNER OR EX-PARTNER?: NO
IN THE PAST 12 MONTHS, HAS THE ELECTRIC, GAS, OIL, OR WATER COMPANY THREATENED TO SHUT OFF SERVICE IN YOUR HOME?: NO
WITHIN THE PAST 12 MONTHS, YOU WORRIED THAT YOUR FOOD WOULD RUN OUT BEFORE YOU GOT THE MONEY TO BUY MORE: NEVER TRUE
WITHIN THE LAST YEAR, HAVE TO BEEN RAPED OR FORCED TO HAVE ANY KIND OF SEXUAL ACTIVITY BY YOUR PARTNER OR EX-PARTNER?: NO
WITHIN THE LAST YEAR, HAVE YOU BEEN AFRAID OF YOUR PARTNER OR EX-PARTNER?: NO
WITHIN THE PAST 12 MONTHS, THE FOOD YOU BOUGHT JUST DIDN'T LAST AND YOU DIDN'T HAVE MONEY TO GET MORE: NEVER TRUE
WITHIN THE LAST YEAR, HAVE YOU BEEN KICKED, HIT, SLAPPED, OR OTHERWISE PHYSICALLY HURT BY YOUR PARTNER OR EX-PARTNER?: NO

## 2024-12-04 ASSESSMENT — FIBROSIS 4 INDEX
FIB4 SCORE: 0.48
FIB4 SCORE: 0.48

## 2024-12-04 ASSESSMENT — PAIN DESCRIPTION - PAIN TYPE
TYPE: ACUTE PAIN

## 2024-12-04 ASSESSMENT — PAIN SCALES - GENERAL: PAINLEVEL: 0 - NO PAIN

## 2024-12-04 NOTE — H&P
History and Physical    Quoc Horan is a 29 y.o. female  at 32w3d by DEREK LYNNE c/w 13wk US who presents for elevated blood pressure.    Subjective:   Patient had a routine prenatal clinic appointment this morning and had severe range BP twice in clinic, 172/102. She was sent to triage for evaluation and continued to have severe range blood pressure and urine protein:creatine was 378. Since arriving in triage patient has received nifedipine x2 and hydralazine x2 and continues to have severe range blood pressure. Patient denies any symptoms of pre-eclampsia. After receving her magnesium bolus patient has been flushed and nauseous with vomiting.     Patient was discussed with Dr. Bette RICHARDSON and he recommended that we proceed with induction of labor today. Patient will receiving betamethasone x1 now and #2 tomorrow if she has not delivered. Fetus is cephalic on bedside ultrasound.     Uterine contractions denies  Leakage of fluid denies  vaginal bleeding denies  fetal movement affirms    ROS:  GEN: denies fever, chills  HEENT: denies headache, denies blurry vision  CV: denies chest pain or palpitations, BLE edema affirms  RESP: denies chest pain or shortness of breath  ABD: denies RUQ pain  : denies dysuria    I personally reviewed the past medical and surgical histories, as well as the problem list.    Prenatal care with [facility] starting at 13w with following problems:  Patient Active Problem List    Diagnosis Date Noted    Encounter for induction of labor 2024    Elevated blood pressure reading in office without diagnosis of hypertension: CHTN vs white coat syndrome 10/24/2024    Supervision of normal first pregnancy, antepartum 2024       History reviewed. No pertinent past medical history.  History reviewed. No pertinent surgical history.  Family History   Problem Relation Age of Onset    No Known Problems Mother      OB History    Para Term  AB Living   1             SAB IAB Ectopic  Molar Multiple Live Births                    # Outcome Date GA Lbr Jared/2nd Weight Sex Type Anes PTL Lv   1 Current              Social History     Socioeconomic History    Marital status:      Spouse name: Not on file    Number of children: Not on file    Years of education: Not on file    Highest education level: Not on file   Occupational History    Not on file   Tobacco Use    Smoking status: Never    Smokeless tobacco: Never   Vaping Use    Vaping status: Former   Substance and Sexual Activity    Alcohol use: Never    Drug use: Never    Sexual activity: Yes     Partners: Male     Birth control/protection: None   Other Topics Concern    Not on file   Social History Narrative    Not on file     Social Drivers of Health     Financial Resource Strain: Not on file   Food Insecurity: No Food Insecurity (12/4/2024)    Hunger Vital Sign     Worried About Running Out of Food in the Last Year: Never true     Ran Out of Food in the Last Year: Never true   Transportation Needs: No Transportation Needs (12/4/2024)    PRAPARE - Transportation     Lack of Transportation (Medical): No     Lack of Transportation (Non-Medical): No   Physical Activity: Not on file   Stress: Not on file   Social Connections: Not on file   Intimate Partner Violence: Not At Risk (12/4/2024)    Humiliation, Afraid, Rape, and Kick questionnaire     Fear of Current or Ex-Partner: No     Emotionally Abused: No     Physically Abused: No     Sexually Abused: No   Housing Stability: Low Risk  (12/4/2024)    Housing Stability Vital Sign     Unable to Pay for Housing in the Last Year: No     Number of Times Moved in the Last Year: 0     Homeless in the Last Year: No     Allergies   Allergen Reactions    Ibuprofen Hives and Swelling        Current Facility-Administered Medications:     NIFEdipine IR (Procardia) capsule 10 mg, 10 mg, Oral, Q20MIN PRN, 10 mg at 12/04/24 1139 **OR** labetalol (Normodyne/Trandate) injection 20-80 mg, 20-80 mg, Intravenous,  Q10 MIN PRN **OR** hydrALAZINE (Apresoline) injection 5-10 mg, 5-10 mg, Intravenous, Q20MIN PRN, Terrell Collins M.D., 10 mg at 12/04/24 1253    LR infusion, , Intravenous, Continuous, Shonda Vazquez M.D., Last Rate: 50 mL/hr at 12/04/24 1244, New Bag at 12/04/24 1244    lidocaine (XYLOCAINE) 1%  injection, 20 mL, Subcutaneous, Once PRN, Shonda Vazquez M.D.    terbutaline (Brethine) injection 0.25 mg, 0.25 mg, Subcutaneous, Once PRN, Shonda Vazquez M.D.    oxytocin (Pitocin) infusion bolus (for post delivery), 10 Units, Intravenous, Once **FOLLOWED BY** oxytocin (Pitocin) infusion bolus (for post delivery), 10 Units, Intravenous, Once **FOLLOWED BY** oxytocin (Pitocin) infusion (for post delivery), 125 mL/hr, Intravenous, Continuous, Shonda Vazquez M.D.    oxytocin (Pitocin) injection 10 Units, 10 Units, Intramuscular, Once PRN, Shonda Vazquez M.D.    miSOPROStol (Cytotec) tablet 25 mcg, 25 mcg, Vaginal, Once, Shonda Vazquez M.D.    calcium GLUConate injection 1 g, 1 g, Intravenous, Once PRN, Shonda Vazquez M.D.    [COMPLETED] magnesium sulfate IVPB premix 4 g, 4 g, Intravenous, Once, Stopped at 12/04/24 1305 **FOLLOWED BY** magnesium sulfate 40 g/1000mL infusion, 2 g/hr, Intravenous, Continuous, Shonda Vazquez M.D., Last Rate: 50 mL/hr at 12/04/24 1309, 2 g/hr at 12/04/24 1309    ondansetron (Zofran ODT) dispertab 4 mg, 4 mg, Oral, Q6HRS PRN **OR** ondansetron (Zofran) syringe/vial injection 4 mg, 4 mg, Intravenous, Q6HRS PRN, Shonda Vazquez M.D., 4 mg at 12/04/24 1312    mag hydrox-al hydrox-simeth (Maalox Plus Es Or Mylanta Ds) suspension 30 mL, 30 mL, Oral, Q6HRS PRN, Shonda Vazquez M.D.    hydrOXYzine HCl (Atarax) tablet 50 mg, 50 mg, Oral, Q6HRS PRN, Shonda Vazquez M.D.    acetaminophen (Tylenol) tablet 1,000 mg, 1,000 mg, Oral, Once PRN, Shonda Vazquez M.D.    betamethasone acetate-betamethasone sodium phosphate (Celestone) injection 12 mg, 12 mg, Intramuscular, Q24HR, Shonda Vazquez M.D.    MAGNESIUM  SULFATE 4 GM/100ML IV SOLN, , , ,     MAGNESIUM SULFATE 40 GM/1000ML IV SOLN, , , ,       Objective:      Vitals:    12/04/24 1310   BP: (!) 145/65   Pulse: (!) 127   Resp:    Temp: 36.9 °C (98.4 °F)   SpO2: 97%       GEN: NAD, AAOx3, calm, cooperative, laying comfortably in bed  HEENT: NCAT, EOMI  CV: +S1S2, RRR, 1+ BLE edema, radial pulses 2+  RESP: CTAB, no cough, breathing comfortably on RA  ABS: soft, NTTP, gravid  : no lesions  MSK: moving all extremities    SVE: CTH    FHT: Cat I, baseline 150, variability moderate, +accels, -decels, no uterine contraction    BSUS: cephalic  Placenta: anterior    Lab Review  Lab:   Blood type: B  Recent Results (from the past 35 weeks)   PPD Skin Test    Collection Time: 05/13/24  5:30 PM   Result Value Ref Range    Ppd Skin Test 0mm    POCT Pregnancy    Collection Time: 07/24/24  8:00 AM   Result Value Ref Range    POC Urine Pregnancy Test Positive     Internal Control Positive Positive     Internal Control Negative Negative    THINPREP PAP W/HPV AND CTNG    Collection Time: 07/24/24  8:44 AM   Result Value Ref Range    DIAGNOSIS: SEE BELOW     Specimen adequacy: SEE BELOW     Performed by: SEE BELOW     Comment Comment     Note: SEE BELOW     Test Methodology: SEE BELOW     HPV Aptima Negative Negative    Chlamydia, Nuc. Acid Amp Negative Negative    Gonococcus, Nuc. Acid Amp Negative Negative   PRENATAL PANEL 3+HIV+UACXI    Collection Time: 08/24/24  7:21 AM   Result Value Ref Range    Color Yellow     Character Clear     Specific Gravity 1.018 <1.035    Ph 8.0 5.0 - 8.0    Glucose Negative Negative mg/dL    Ketones Trace (A) Negative mg/dL    Protein Negative Negative mg/dL    Bilirubin Negative Negative    Urobilinogen, Urine 0.2 Negative    Nitrite Negative Negative    Leukocyte Esterase Negative Negative    Occult Blood Negative Negative    Micro Urine Req see below     WBC 13.5 (H) 4.8 - 10.8 K/uL    RBC 4.82 4.20 - 5.40 M/uL    Hemoglobin 14.6 12.0 - 16.0 g/dL     Hematocrit 43.0 37.0 - 47.0 %    MCV 89.2 81.4 - 97.8 fL    MCH 30.3 27.0 - 33.0 pg    MCHC 34.0 32.2 - 35.5 g/dL    RDW 43.5 35.9 - 50.0 fL    Platelet Count 319 164 - 446 K/uL    MPV 9.5 9.0 - 12.9 fL    Neutrophils-Polys 82.20 (H) 44.00 - 72.00 %    Lymphocytes 11.20 (L) 22.00 - 41.00 %    Monocytes 4.80 0.00 - 13.40 %    Eosinophils 0.70 0.00 - 6.90 %    Basophils 0.40 0.00 - 1.80 %    Immature Granulocytes 0.70 0.00 - 0.90 %    Nucleated RBC 0.00 0.00 - 0.20 /100 WBC    Neutrophils (Absolute) 11.10 (H) 1.82 - 7.42 K/uL    Lymphs (Absolute) 1.51 1.00 - 4.80 K/uL    Monos (Absolute) 0.65 0.00 - 0.85 K/uL    Eos (Absolute) 0.09 0.00 - 0.51 K/uL    Baso (Absolute) 0.05 0.00 - 0.12 K/uL    Immature Granulocytes (abs) 0.09 0.00 - 0.11 K/uL    NRBC (Absolute) 0.00 K/uL    Rubella IgG Antibody 95.40 IU/mL    Hepatitis B Surface Antigen Non-Reactive Non-Reactive    Syphilis, Treponemal Qual Non-Reactive Non-Reactive   HIV AG/AB COMBO ASSAY SCREENING    Collection Time: 08/24/24  7:21 AM   Result Value Ref Range    HIV Ag/Ab Combo Assay Non-Reactive Non Reactive   URINE DRUG SCREEN W/CONF (AR)    Collection Time: 08/24/24  7:21 AM   Result Value Ref Range    Urine Amphetamine-Methamphetam Negative Cutoff 300 ng/mL    Barbiturates Negative Cutoff 200 ng/mL    Benzodiazepines Negative Cutoff 200 ng/mL    Propoxyphene Negative Cutoff 300 ng/mL    Cocaine Metabolite Negative Cutoff 150 ng/mL    Methadone Negative Cutoff 150 ng/mL    Codeine-Morphine Negative Cutoff 300 ng/mL    Phencyclidine -Pcp Negative Cutoff 25 ng/mL    Cannabinoid Metab Negative Cutoff 50 ng/mL    Creatinine Urine 96.5 20.0 - 400.0 mg/dL    Drug Comment Urine Drugs See Note    OP Prenatal Panel-Blood Bank    Collection Time: 08/24/24  7:21 AM   Result Value Ref Range    ABO Grouping Only B     Rh Grouping Only POS     Antibody Screen Scrn NEG    HORIZON 4 (SMA, CF, FRAGILE X, DMD)    Collection Time: 09/05/24  3:04 AM   Result Value Ref Range    REPORT  SUMMARY Negative     CYSTIC FIBROSIS Negative     DUCHENNE/SOLARES MUSCULAR DYSTROPHY Negative     FRAGILE X SYNDROME Negative     SPINAL MUSCULAR ATROPHY Negative     PANEL NOTES See Notes     REPORT NOTE See Notes     FOOTNOTES See Notes    PROTEIN/CREAT RATIO URINE    Collection Time: 11/02/24  7:32 AM   Result Value Ref Range    Total Protein, Urine 8.0 0.0 - 15.0 mg/dL    Creatinine, Random Urine 64.75 mg/dL    Protein Creatinine Ratio 124 (H) 10 - 107 mg/g   Comp Metabolic Panel    Collection Time: 11/02/24  7:32 AM   Result Value Ref Range    Sodium 135 135 - 145 mmol/L    Potassium 3.7 3.6 - 5.5 mmol/L    Chloride 103 96 - 112 mmol/L    Co2 20 20 - 33 mmol/L    Anion Gap 12.0 7.0 - 16.0    Glucose 86 65 - 99 mg/dL    Bun 6 (L) 8 - 22 mg/dL    Creatinine 0.42 (L) 0.50 - 1.40 mg/dL    Calcium 8.8 8.5 - 10.5 mg/dL    Correct Calcium 9.4 8.5 - 10.5 mg/dL    AST(SGOT) 18 12 - 45 U/L    ALT(SGPT) 16 2 - 50 U/L    Alkaline Phosphatase 101 (H) 30 - 99 U/L    Total Bilirubin 0.2 0.1 - 1.5 mg/dL    Albumin 3.3 3.2 - 4.9 g/dL    Total Protein 6.7 6.0 - 8.2 g/dL    Globulin 3.4 1.9 - 3.5 g/dL    A-G Ratio 1.0 g/dL   T.PALLIDUM AB PARKER (SCREENING)    Collection Time: 11/02/24  7:32 AM   Result Value Ref Range    Syphilis, Treponemal Qual Non-Reactive Non-Reactive   CBC WITHOUT DIFFERENTIAL    Collection Time: 11/02/24  7:32 AM   Result Value Ref Range    WBC 11.0 (H) 4.8 - 10.8 K/uL    RBC 4.21 4.20 - 5.40 M/uL    Hemoglobin 12.6 12.0 - 16.0 g/dL    Hematocrit 37.6 37.0 - 47.0 %    MCV 89.3 81.4 - 97.8 fL    MCH 29.9 27.0 - 33.0 pg    MCHC 33.5 32.2 - 35.5 g/dL    RDW 41.1 35.9 - 50.0 fL    Platelet Count 270 164 - 446 K/uL    MPV 9.8 9.0 - 12.9 fL   ESTIMATED GFR    Collection Time: 11/02/24  7:32 AM   Result Value Ref Range    GFR (CKD-EPI) 135 >60 mL/min/1.73 m 2   GLUCOSE 1HR GESTATIONAL    Collection Time: 11/02/24  8:41 AM   Result Value Ref Range    Glucose, Post Dose 127 70 - 139 mg/dL   PROTEIN/CREAT RATIO URINE     Collection Time: 24  9:50 AM   Result Value Ref Range    Total Protein, Urine 18.5 (H) 0.0 - 15.0 mg/dL    Creatinine, Random Urine 48.90 mg/dL    Protein Creatinine Ratio 378 (H) 10 - 107 mg/g   CBC WITHOUT DIFFERENTIAL    Collection Time: 24 10:39 AM   Result Value Ref Range    WBC 13.2 (H) 4.8 - 10.8 K/uL    RBC 4.39 4.20 - 5.40 M/uL    Hemoglobin 13.0 12.0 - 16.0 g/dL    Hematocrit 37.7 37.0 - 47.0 %    MCV 85.9 81.4 - 97.8 fL    MCH 29.6 27.0 - 33.0 pg    MCHC 34.5 32.2 - 35.5 g/dL    RDW 38.0 35.9 - 50.0 fL    Platelet Count 243 164 - 446 K/uL    MPV 10.4 9.0 - 12.9 fL   Comp Metabolic Panel    Collection Time: 24 10:39 AM   Result Value Ref Range    Sodium 136 135 - 145 mmol/L    Potassium 4.0 3.6 - 5.5 mmol/L    Chloride 104 96 - 112 mmol/L    Co2 20 20 - 33 mmol/L    Anion Gap 12.0 7.0 - 16.0    Glucose 101 (H) 65 - 99 mg/dL    Bun 9 8 - 22 mg/dL    Creatinine 0.49 (L) 0.50 - 1.40 mg/dL    Calcium 9.6 8.5 - 10.5 mg/dL    Correct Calcium 9.9 8.5 - 10.5 mg/dL    AST(SGOT) 23 12 - 45 U/L    ALT(SGPT) 19 2 - 50 U/L    Alkaline Phosphatase 183 (H) 30 - 99 U/L    Total Bilirubin 0.2 0.1 - 1.5 mg/dL    Albumin 3.6 3.2 - 4.9 g/dL    Total Protein 6.8 6.0 - 8.2 g/dL    Globulin 3.2 1.9 - 3.5 g/dL    A-G Ratio 1.1 g/dL   URIC ACID    Collection Time: 24 10:39 AM   Result Value Ref Range    Uric Acid 4.3 1.9 - 8.2 mg/dL   ESTIMATED GFR    Collection Time: 24 10:39 AM   Result Value Ref Range    GFR (CKD-EPI) 130 >60 mL/min/1.73 m 2     Recent Labs     24  1730 24  0721 24  0732 24  1039   ABOGROUP  --  B  --   --    ABSCRN  --  NEG  --   --    HEMOGLOBIN  --  14.6   < > 13.0   PLATELETCT  --  319   < > 243   RUBELLAIGG  --  95.40  --   --    HEPBSAG  --  Non-Reactive  --   --    PPDSKINTEST 0mm  --   --   --     < > = values in this interval not displayed.           Assessment and Plan:     Quoc Horan is a 29 y.o. female  at 32w3d by LMP c/w 13wk  US who presents for elevated blood pressure.    #SIUP at 32w3d by LMP c/w 13wk US  - prenatal care with [facility]  - Labor status: Not in labor.  - BSUS: cephalic  - placenta anterior    #fetal status, Cat I  - reassuring  - continuous fetal monitoring  - NICU consulted  - 29w US, EFW 92%    #labor induction/augmentation  - Due to unfavorable cervix, we will use miso  - pitocin titrated to adequate contractions  - clear liquid diet  - pt can epidural when she desires  - mIVF as indicated     #pre-eclampsia w/ severe features  - magnesium started in triage  - hypertensive protocol ordered, patient has received nifedipine x2 and hydralazine x2 so far  - CMP and CBC WNL; urine protein:creatinine 378  - chart notes possible chronic hypertension vs white-coat hypertension, she had elevated BP in clinic but normal range BP at home  - patient was recommended aspirin prophylaxis at initial prenatal appointment, but declined due to due allergy to ibuprofen    #obesity, BMI 31.55  - 1GTT 127    #GBS unknown and , will receiving prophylaxis  - ampicillin per protocol    #rubella: immune    #HIV NR, Trep NR, HBsAg NR, Hep C unknown, GCCT neg/neg    #blood type B+/-    #contraception: TBD    #HCM:  - Tdap 2024    Disposition: Admit to Labor    Shonda Vazquez MD, MPH

## 2024-12-04 NOTE — PROGRESS NOTES
OB Visit Note - 32w3d     MEDICAL DECISION MAKING:  Quoc Horan is a 29 y.o. female  at 32w3d    Today's visit addressed:   Denies HA, visual changes. Some brief episodes of RUQ pain last weekend. Reports some zay valdovinos contractions. No VB or LOF. Good FM.     Pregnancy complicated by:  Patient Active Problem List   Diagnosis    Supervision of normal first pregnancy, antepartum    Elevated blood pressure reading in office without diagnosis of hypertension: CHTN vs white coat syndrome   BP repeated in office: 172/102  Denies HA or visual changes. Did have some RUQ pain briefly over last weekend.  Also c/o swelling generalized on right side of body.     Discussed RSV vaccination between 32-36w, patient to consider.     The patient will follow up in 2 week(s). She was counseled to call or return for vaginal bleeding, regular contractions, leakage of fluid or decreased fetal movement. Daily kick counts.     To L & D for preeclamptic evaluation.     Sabrina Becerril M.D.

## 2024-12-04 NOTE — ED PROVIDER NOTES
OB Triage/ED Evaluation Note      Quoc Horan is a 29 y.o. female  at 32w3d by DEREK LYNNE c/w 13wk US who presents for elevated blood pressure.    Subjective:   Patient had a routine prenatal clinic appointment this morning and had severe range BP twice in clinic, 172/102. She was sent to triage for evaluation and continued to have severe range blood pressure and urine protein:creatine was 378. Since arriving in triage patient has received nifedipine x2 and hydralazine x2 and continues to have severe range blood pressure. Patient denies any symptoms of pre-eclampsia. After receving her magnesium bolus patient has been flushed and nauseous with vomiting.     Patient was discussed with Dr. Bette RICHARDSON and he recommended that we proceed with induction of labor today. Patient will receiving betamethasone x1 now and #2 tomorrow if she has not delivered. Fetus is cephalic on bedside ultrasound.     Uterine contractions denies  Leakage of fluid denies  vaginal bleeding denies  fetal movement affirms    ROS:  GEN: denies fever, chills  HEENT: denies headache, denies blurry vision  CV: denies chest pain or palpitations, BLE edema affirms  RESP: denies chest pain or shortness of breath  ABD: denies RUQ pain  : denies dysuria    I personally reviewed the past medical and surgical histories, as well as the problem list.    Prenatal care with [facility] starting at 13w with following problems:  Patient Active Problem List    Diagnosis Date Noted    Encounter for induction of labor 2024    Elevated blood pressure reading in office without diagnosis of hypertension: CHTN vs white coat syndrome 10/24/2024    Supervision of normal first pregnancy, antepartum 2024       History reviewed. No pertinent past medical history.  History reviewed. No pertinent surgical history.  Family History   Problem Relation Age of Onset    No Known Problems Mother      OB History    Para Term  AB Living   1             SAB IAB  Ectopic Molar Multiple Live Births                    # Outcome Date GA Lbr Jared/2nd Weight Sex Type Anes PTL Lv   1 Current              Social History     Socioeconomic History    Marital status:      Spouse name: Not on file    Number of children: Not on file    Years of education: Not on file    Highest education level: Not on file   Occupational History    Not on file   Tobacco Use    Smoking status: Never    Smokeless tobacco: Never   Vaping Use    Vaping status: Former   Substance and Sexual Activity    Alcohol use: Never    Drug use: Never    Sexual activity: Yes     Partners: Male     Birth control/protection: None   Other Topics Concern    Not on file   Social History Narrative    Not on file     Social Drivers of Health     Financial Resource Strain: Not on file   Food Insecurity: No Food Insecurity (12/4/2024)    Hunger Vital Sign     Worried About Running Out of Food in the Last Year: Never true     Ran Out of Food in the Last Year: Never true   Transportation Needs: No Transportation Needs (12/4/2024)    PRAPARE - Transportation     Lack of Transportation (Medical): No     Lack of Transportation (Non-Medical): No   Physical Activity: Not on file   Stress: Not on file   Social Connections: Not on file   Intimate Partner Violence: Not At Risk (12/4/2024)    Humiliation, Afraid, Rape, and Kick questionnaire     Fear of Current or Ex-Partner: No     Emotionally Abused: No     Physically Abused: No     Sexually Abused: No   Housing Stability: Low Risk  (12/4/2024)    Housing Stability Vital Sign     Unable to Pay for Housing in the Last Year: No     Number of Times Moved in the Last Year: 0     Homeless in the Last Year: No     Allergies   Allergen Reactions    Ibuprofen Hives and Swelling        Current Facility-Administered Medications:     NIFEdipine IR (Procardia) capsule 10 mg, 10 mg, Oral, Q20MIN PRN, 10 mg at 12/04/24 1139 **OR** labetalol (Normodyne/Trandate) injection 20-80 mg, 20-80 mg,  Intravenous, Q10 MIN PRN **OR** hydrALAZINE (Apresoline) injection 5-10 mg, 5-10 mg, Intravenous, Q20MIN PRN, Terrell Collins M.D., 10 mg at 12/04/24 1253    LR infusion, , Intravenous, Continuous, Shonda Vazquez M.D., Last Rate: 50 mL/hr at 12/04/24 1244, New Bag at 12/04/24 1244    lidocaine (XYLOCAINE) 1%  injection, 20 mL, Subcutaneous, Once PRN, Shonda Vazquez M.D.    terbutaline (Brethine) injection 0.25 mg, 0.25 mg, Subcutaneous, Once PRN, Shonda Vazquez M.D.    oxytocin (Pitocin) infusion bolus (for post delivery), 10 Units, Intravenous, Once **FOLLOWED BY** oxytocin (Pitocin) infusion bolus (for post delivery), 10 Units, Intravenous, Once **FOLLOWED BY** oxytocin (Pitocin) infusion (for post delivery), 125 mL/hr, Intravenous, Continuous, Shonda Vazquez M.D.    oxytocin (Pitocin) injection 10 Units, 10 Units, Intramuscular, Once PRN, Shonda Vazquez M.D.    miSOPROStol (Cytotec) tablet 25 mcg, 25 mcg, Vaginal, Once, Shonda Vazquez M.D.    calcium GLUConate injection 1 g, 1 g, Intravenous, Once PRN, Shonda Vazquez M.D.    [COMPLETED] magnesium sulfate IVPB premix 4 g, 4 g, Intravenous, Once, Stopped at 12/04/24 1305 **FOLLOWED BY** magnesium sulfate 40 g/1000mL infusion, 2 g/hr, Intravenous, Continuous, Shonda Vazquez M.D., Last Rate: 50 mL/hr at 12/04/24 1309, 2 g/hr at 12/04/24 1309    ondansetron (Zofran ODT) dispertab 4 mg, 4 mg, Oral, Q6HRS PRN **OR** ondansetron (Zofran) syringe/vial injection 4 mg, 4 mg, Intravenous, Q6HRS PRN, Shonda Vazquez M.D., 4 mg at 12/04/24 1312    mag hydrox-al hydrox-simeth (Maalox Plus Es Or Mylanta Ds) suspension 30 mL, 30 mL, Oral, Q6HRS PRN, Shonda Vazquez M.D.    hydrOXYzine HCl (Atarax) tablet 50 mg, 50 mg, Oral, Q6HRS PRN, Shonda Vazquez M.D.    acetaminophen (Tylenol) tablet 1,000 mg, 1,000 mg, Oral, Once PRN, Shonda Vazquez M.D.    betamethasone acetate-betamethasone sodium phosphate (Celestone) injection 12 mg, 12 mg, Intramuscular, Q24HR, Shonda Vazquez M.D.     MAGNESIUM SULFATE 4 GM/100ML IV SOLN, , , ,     MAGNESIUM SULFATE 40 GM/1000ML IV SOLN, , , ,       Objective:      Vitals:    12/04/24 1310   BP: (!) 145/65   Pulse: (!) 127   Resp:    Temp: 36.9 °C (98.4 °F)   SpO2: 97%       GEN: NAD, AAOx3, calm, cooperative, laying comfortably in bed  HEENT: NCAT, EOMI  CV: +S1S2, RRR, 1+ BLE edema, radial pulses 2+  RESP: CTAB, no cough, breathing comfortably on RA  ABS: soft, NTTP, gravid  : no lesions  MSK: moving all extremities    SVE: CTH    FHT: Cat I, baseline 150, variability moderate, +accels, -decels, no uterine contraction    BSUS: cephalic  Placenta: anterior    Lab Review  Lab:   Blood type: B  Recent Results (from the past 35 weeks)   PPD Skin Test    Collection Time: 05/13/24  5:30 PM   Result Value Ref Range    Ppd Skin Test 0mm    POCT Pregnancy    Collection Time: 07/24/24  8:00 AM   Result Value Ref Range    POC Urine Pregnancy Test Positive     Internal Control Positive Positive     Internal Control Negative Negative    THINPREP PAP W/HPV AND CTNG    Collection Time: 07/24/24  8:44 AM   Result Value Ref Range    DIAGNOSIS: SEE BELOW     Specimen adequacy: SEE BELOW     Performed by: SEE BELOW     Comment Comment     Note: SEE BELOW     Test Methodology: SEE BELOW     HPV Aptima Negative Negative    Chlamydia, Nuc. Acid Amp Negative Negative    Gonococcus, Nuc. Acid Amp Negative Negative   PRENATAL PANEL 3+HIV+UACXI    Collection Time: 08/24/24  7:21 AM   Result Value Ref Range    Color Yellow     Character Clear     Specific Gravity 1.018 <1.035    Ph 8.0 5.0 - 8.0    Glucose Negative Negative mg/dL    Ketones Trace (A) Negative mg/dL    Protein Negative Negative mg/dL    Bilirubin Negative Negative    Urobilinogen, Urine 0.2 Negative    Nitrite Negative Negative    Leukocyte Esterase Negative Negative    Occult Blood Negative Negative    Micro Urine Req see below     WBC 13.5 (H) 4.8 - 10.8 K/uL    RBC 4.82 4.20 - 5.40 M/uL    Hemoglobin 14.6 12.0 - 16.0  g/dL    Hematocrit 43.0 37.0 - 47.0 %    MCV 89.2 81.4 - 97.8 fL    MCH 30.3 27.0 - 33.0 pg    MCHC 34.0 32.2 - 35.5 g/dL    RDW 43.5 35.9 - 50.0 fL    Platelet Count 319 164 - 446 K/uL    MPV 9.5 9.0 - 12.9 fL    Neutrophils-Polys 82.20 (H) 44.00 - 72.00 %    Lymphocytes 11.20 (L) 22.00 - 41.00 %    Monocytes 4.80 0.00 - 13.40 %    Eosinophils 0.70 0.00 - 6.90 %    Basophils 0.40 0.00 - 1.80 %    Immature Granulocytes 0.70 0.00 - 0.90 %    Nucleated RBC 0.00 0.00 - 0.20 /100 WBC    Neutrophils (Absolute) 11.10 (H) 1.82 - 7.42 K/uL    Lymphs (Absolute) 1.51 1.00 - 4.80 K/uL    Monos (Absolute) 0.65 0.00 - 0.85 K/uL    Eos (Absolute) 0.09 0.00 - 0.51 K/uL    Baso (Absolute) 0.05 0.00 - 0.12 K/uL    Immature Granulocytes (abs) 0.09 0.00 - 0.11 K/uL    NRBC (Absolute) 0.00 K/uL    Rubella IgG Antibody 95.40 IU/mL    Hepatitis B Surface Antigen Non-Reactive Non-Reactive    Syphilis, Treponemal Qual Non-Reactive Non-Reactive   HIV AG/AB COMBO ASSAY SCREENING    Collection Time: 08/24/24  7:21 AM   Result Value Ref Range    HIV Ag/Ab Combo Assay Non-Reactive Non Reactive   URINE DRUG SCREEN W/CONF (AR)    Collection Time: 08/24/24  7:21 AM   Result Value Ref Range    Urine Amphetamine-Methamphetam Negative Cutoff 300 ng/mL    Barbiturates Negative Cutoff 200 ng/mL    Benzodiazepines Negative Cutoff 200 ng/mL    Propoxyphene Negative Cutoff 300 ng/mL    Cocaine Metabolite Negative Cutoff 150 ng/mL    Methadone Negative Cutoff 150 ng/mL    Codeine-Morphine Negative Cutoff 300 ng/mL    Phencyclidine -Pcp Negative Cutoff 25 ng/mL    Cannabinoid Metab Negative Cutoff 50 ng/mL    Creatinine Urine 96.5 20.0 - 400.0 mg/dL    Drug Comment Urine Drugs See Note    OP Prenatal Panel-Blood Bank    Collection Time: 08/24/24  7:21 AM   Result Value Ref Range    ABO Grouping Only B     Rh Grouping Only POS     Antibody Screen Scrn NEG    HORIZON 4 (SMA, CF, FRAGILE X, DMD)    Collection Time: 09/05/24  3:04 AM   Result Value Ref Range     REPORT SUMMARY Negative     CYSTIC FIBROSIS Negative     DUCHENNE/SOLARES MUSCULAR DYSTROPHY Negative     FRAGILE X SYNDROME Negative     SPINAL MUSCULAR ATROPHY Negative     PANEL NOTES See Notes     REPORT NOTE See Notes     FOOTNOTES See Notes    PROTEIN/CREAT RATIO URINE    Collection Time: 11/02/24  7:32 AM   Result Value Ref Range    Total Protein, Urine 8.0 0.0 - 15.0 mg/dL    Creatinine, Random Urine 64.75 mg/dL    Protein Creatinine Ratio 124 (H) 10 - 107 mg/g   Comp Metabolic Panel    Collection Time: 11/02/24  7:32 AM   Result Value Ref Range    Sodium 135 135 - 145 mmol/L    Potassium 3.7 3.6 - 5.5 mmol/L    Chloride 103 96 - 112 mmol/L    Co2 20 20 - 33 mmol/L    Anion Gap 12.0 7.0 - 16.0    Glucose 86 65 - 99 mg/dL    Bun 6 (L) 8 - 22 mg/dL    Creatinine 0.42 (L) 0.50 - 1.40 mg/dL    Calcium 8.8 8.5 - 10.5 mg/dL    Correct Calcium 9.4 8.5 - 10.5 mg/dL    AST(SGOT) 18 12 - 45 U/L    ALT(SGPT) 16 2 - 50 U/L    Alkaline Phosphatase 101 (H) 30 - 99 U/L    Total Bilirubin 0.2 0.1 - 1.5 mg/dL    Albumin 3.3 3.2 - 4.9 g/dL    Total Protein 6.7 6.0 - 8.2 g/dL    Globulin 3.4 1.9 - 3.5 g/dL    A-G Ratio 1.0 g/dL   T.PALLIDUM AB PARKER (SCREENING)    Collection Time: 11/02/24  7:32 AM   Result Value Ref Range    Syphilis, Treponemal Qual Non-Reactive Non-Reactive   CBC WITHOUT DIFFERENTIAL    Collection Time: 11/02/24  7:32 AM   Result Value Ref Range    WBC 11.0 (H) 4.8 - 10.8 K/uL    RBC 4.21 4.20 - 5.40 M/uL    Hemoglobin 12.6 12.0 - 16.0 g/dL    Hematocrit 37.6 37.0 - 47.0 %    MCV 89.3 81.4 - 97.8 fL    MCH 29.9 27.0 - 33.0 pg    MCHC 33.5 32.2 - 35.5 g/dL    RDW 41.1 35.9 - 50.0 fL    Platelet Count 270 164 - 446 K/uL    MPV 9.8 9.0 - 12.9 fL   ESTIMATED GFR    Collection Time: 11/02/24  7:32 AM   Result Value Ref Range    GFR (CKD-EPI) 135 >60 mL/min/1.73 m 2   GLUCOSE 1HR GESTATIONAL    Collection Time: 11/02/24  8:41 AM   Result Value Ref Range    Glucose, Post Dose 127 70 - 139 mg/dL   PROTEIN/CREAT RATIO  URINE    Collection Time: 24  9:50 AM   Result Value Ref Range    Total Protein, Urine 18.5 (H) 0.0 - 15.0 mg/dL    Creatinine, Random Urine 48.90 mg/dL    Protein Creatinine Ratio 378 (H) 10 - 107 mg/g   CBC WITHOUT DIFFERENTIAL    Collection Time: 24 10:39 AM   Result Value Ref Range    WBC 13.2 (H) 4.8 - 10.8 K/uL    RBC 4.39 4.20 - 5.40 M/uL    Hemoglobin 13.0 12.0 - 16.0 g/dL    Hematocrit 37.7 37.0 - 47.0 %    MCV 85.9 81.4 - 97.8 fL    MCH 29.6 27.0 - 33.0 pg    MCHC 34.5 32.2 - 35.5 g/dL    RDW 38.0 35.9 - 50.0 fL    Platelet Count 243 164 - 446 K/uL    MPV 10.4 9.0 - 12.9 fL   Comp Metabolic Panel    Collection Time: 24 10:39 AM   Result Value Ref Range    Sodium 136 135 - 145 mmol/L    Potassium 4.0 3.6 - 5.5 mmol/L    Chloride 104 96 - 112 mmol/L    Co2 20 20 - 33 mmol/L    Anion Gap 12.0 7.0 - 16.0    Glucose 101 (H) 65 - 99 mg/dL    Bun 9 8 - 22 mg/dL    Creatinine 0.49 (L) 0.50 - 1.40 mg/dL    Calcium 9.6 8.5 - 10.5 mg/dL    Correct Calcium 9.9 8.5 - 10.5 mg/dL    AST(SGOT) 23 12 - 45 U/L    ALT(SGPT) 19 2 - 50 U/L    Alkaline Phosphatase 183 (H) 30 - 99 U/L    Total Bilirubin 0.2 0.1 - 1.5 mg/dL    Albumin 3.6 3.2 - 4.9 g/dL    Total Protein 6.8 6.0 - 8.2 g/dL    Globulin 3.2 1.9 - 3.5 g/dL    A-G Ratio 1.1 g/dL   URIC ACID    Collection Time: 24 10:39 AM   Result Value Ref Range    Uric Acid 4.3 1.9 - 8.2 mg/dL   ESTIMATED GFR    Collection Time: 24 10:39 AM   Result Value Ref Range    GFR (CKD-EPI) 130 >60 mL/min/1.73 m 2     Recent Labs     24  1730 24  0721 24  0732 24  1039   ABOGROUP  --  B  --   --    ABSCRN  --  NEG  --   --    HEMOGLOBIN  --  14.6   < > 13.0   PLATELETCT  --  319   < > 243   RUBELLAIGG  --  95.40  --   --    HEPBSAG  --  Non-Reactive  --   --    PPDSKINTEST 0mm  --   --   --     < > = values in this interval not displayed.           Assessment and Plan:     Quoc Horan is a 29 y.o. female  at 32w3d by LMP  c/w 13wk US who presents for elevated blood pressure.    #SIUP at 32w3d by LMP c/w 13wk US  - prenatal care with [facility]  - Labor status: Not in labor.  - BSUS: cephalic  - placenta anterior    #fetal status, Cat I  - reassuring  - continuous fetal monitoring  - NICU consulted  - 29w US, EFW 92%    #labor induction/augmentation  - Due to unfavorable cervix, we will use miso  - pitocin titrated to adequate contractions  - clear liquid diet  - pt can epidural when she desires  - mIVF as indicated     #pre-eclampsia w/ severe features  - magnesium started in triage  - hypertensive protocol ordered, patient has received nifedipine x2 and hydralazine x2 so far  - CMP and CBC WNL; urine protein:creatinine 378  - chart notes possible chronic hypertension vs white-coat hypertension, she had elevated BP in clinic but normal range BP at home  - patient was recommended aspirin prophylaxis at initial prenatal appointment, but declined due to due allergy to ibuprofen    #obesity, BMI 31.55  - 1GTT 127    #GBS unknown and , will receiving prophylaxis  - ampicillin per protocol    #rubella: immune    #HIV NR, Trep NR, HBsAg NR, Hep C unknown, GCCT neg/neg    #blood type B+/-    #contraception: TBD    #HCM:  - Tdap 2024    Disposition: Admit to Labor    Shonda Vazquez MD, MPH

## 2024-12-04 NOTE — PROGRESS NOTES
Pt here today for OBFV   +FM movemnet  No VB, LOF. 's   Phone number [phone]  Pharmacy verified   Pt states wants to talk about bp   Rsv ask at next visit

## 2024-12-04 NOTE — PROGRESS NOTES
1415: Report received from Karla MCCLELLAND, updated on POC  1900: Report given to Janiya MCCLELLAND

## 2024-12-04 NOTE — PROGRESS NOTES
0939- 29 y.o , 32/3, was sent over by office for elevated BP's. Had pressures in 160-170's in office today. MD not sure of CHTN. Pt states she has had some RUQ pain for last 3 days that comes and goes. Denies HA or vision changes.  Pt verbalizes pos fetal movement. Denies LOF or bleeding  - TOCO/ FHM applied, discussed POC  - orders for PCR, CBC, CMP, Uric acid and HTN protocol.  - pt not responding to oral nifedipine, BP cont to be elevated. Order for IV and hydralazine first, then labetalol per MD.  - PCR ratio back, MD discussed POC w/ Dr. Amos, orders to induce patient.  Mag started.  1305- report given to KRYSTIN Acosta

## 2024-12-04 NOTE — NON-PROVIDER
"CC: Hypertension in the setting of pregnancy    HPI: This is a 29-year-old  female at 32w3d with a history of gestational hypertension versus pre-eclampsia, who was referred to JALEN from clinic for elevated blood pressures 160-170s. Patient reports she has a history of high blood pressures in clinic related to \"white coat syndrome\" but has routinely logged her blood pressures at home with normal readings of 120s-130s. Two days ago patient reported elevated home readings. Patient associated reports mild, sharp intermittent, right upper quadrant pain that has occurred 4x over the last five days, resolving without intervention as well as increased swelling in her hands and legs bilaterally over the last few days as well. Patient states she has had the occasional headache throughout the course of pregnancy, has never had to take medication for pain relief, currently has a mild one. Denies vision changes, chest pain, shortness of breath. No vaginal bleeding, leakage of fluid. Patient endorses very mild, intermittent contractions, reports good, active fetal movements.     Gyn Hx:  LMP: 2024. Menstrual cycles prior to pregnancy were regular, with moderate bleeding, lasting 6-7 days. Menarche at age 11.   Past Pregnancy:No prior pregnancies  STDs: NA  PAP: 24 with no abnormal findings and HPV negative  HPV vaccine: No        PMHx:   Diagnosis: NA  Medications: Prenatal vitamins  Allergies: Ibuprofen  Surgeries: NA  Hospitalization: NA    Family Hx: Maternal grandmother with diagnosed with breast cancer later in life.     Social Hx:  Tobacco: No  Alcohol: No  Recreational Drug use: No        ROS:  Gen: No fevers, chills  Respiratory: No cough, shortness of breath  Cardiac: No chest pain.  GI: Positive for nausea and vomiting. No abdominal pain.  Urinary: No dysuria, vaginal bleeding or discharge. No leakage of fluids.  Neuro: Positive for headache.  Extremities: Positive for swelling in hands and " feet.      PE:  Vitals:  Temp: 97.8F HR: 92 bpm BP: 147/79 RR: 16 SpO2: 96%    Constitutional: The patient is well developed and well nourished.  Neurologic: Patient is oriented to time place and person.   Respiratory: normal effort, no respiratory distress.   Abdomen: Gravid uterus consistent with 32 weeks gestation. Non tender to palpation.  Pelvic: 0/50/-3  Extremeties: Mild non pitting edema present in bilateral hands and feet.   Skin:Warm and well perfused. No rash observed.     Ultrasound: Bedside handheld ultrasound was performed today and confirmed cephalic presentation.    Fetal Heart Tones: 150 bpm baseline with moderate variability, accelerations, no decelerations     Dated by LMP c/w 13w1d US  24: Normal fetal anatomy and growth. Anterior placenta no previa. CL 3.89 cm  10/29/24: EFW 1298g (92%), PRABHU 20    Labs:  CBC: remarkable for elevated WBC of 13.2, normal hemoglobin of 13.0, normal platelet of 243  CMP: Electrolytes and liver enzymes within normal limits, Mg 4.1, uric acid WNL at 4.3  Urine Chem: Total protein 18.5, Pro/Cr elevated at 378  Estimated GFR: 130  Syphilis: Non-reactive        Prenatal labs Reviewed:  Blood type: B+ with negative antibody screening  HIV- non reactive  Rubella IgG 95.4   HBsAntigen- non reactive  Syphilis- non reactive  Gonorrhea/Chlamydia: Negative  CBC- notable WBC 13.5 with neutrophilic predominance otherwise unremarkable  UA- trace ketones otherwise unremarkable  UDS- neg  PAP 24 negative for intraepithelial lesion or malignancy  Genetic screen negative for CF, muscular dystrophy, fragile X, SMA    1 hr Glucose Tolerance Test: 127    Vaccinations:  Flu: declined  Tdap: 24  COVID: declined  RSV: previously planned on receiving 32-36wks,       Assessment:  This is a 29-year-old  female at 32w3d with a history of gestational hypertension versus pre-eclampsia, who was referred to JALEN from clinic for elevated blood pressures 160-170s. Patient  complains of associated intermittent right upper quadrant pain the past five days, gradually worsening swelling in bilateral hands and legs, and mild headache today. Exam is significant for mild swelling in bilateral hands and feet, otherwise normal.     # Gestational hypertension versus Pre-eclampsia  - Patient referred from clinic for BPs of 160-170s.  - PIH labs unremarkable except for Urine Chem: Total protein 18.5, Pro/Cr elevated at 378  - BP remained elevated despite magnesium and procardiax2, hydralazinex2  - MFM consulted, recommended induction of labor   Plan:     - betamethasone administered    - continue magnesium, procardia 10 mg, hydralazine 5 mg    #Induction of labor  - induce labor per [facility] recommendations  - given 32 week gestational age and cervical exam of 0/50/-3, labor augmentation required  - Pain control discussed, epidural offered at patients request   Plan:    - misoprostol for cervical ripening    - oxytocin for contraction augmentation    #Group B Strep Status unknown  - given patient is 32 weeks, GBS testing and status is unknown   Plan:    - administer prophylactic ampicillin     #Nausea  #Vomiting  - Patient reporting nausea and active vomiting after receiving magnesium bolus   Plan:    - zofran ordered

## 2024-12-05 ENCOUNTER — ANESTHESIA EVENT (OUTPATIENT)
Dept: OBGYN | Facility: MEDICAL CENTER | Age: 29
End: 2024-12-05
Payer: OTHER GOVERNMENT

## 2024-12-05 ENCOUNTER — ANESTHESIA (OUTPATIENT)
Dept: OBGYN | Facility: MEDICAL CENTER | Age: 29
End: 2024-12-05
Payer: OTHER GOVERNMENT

## 2024-12-05 LAB
ALBUMIN SERPL BCP-MCNC: 3.5 G/DL (ref 3.2–4.9)
ALBUMIN/GLOB SERPL: 1 G/DL
ALP SERPL-CCNC: 177 U/L (ref 30–99)
ALT SERPL-CCNC: 17 U/L (ref 2–50)
ANION GAP SERPL CALC-SCNC: 14 MMOL/L (ref 7–16)
APPEARANCE UR: ABNORMAL
AST SERPL-CCNC: 26 U/L (ref 12–45)
BILIRUB SERPL-MCNC: 0.3 MG/DL (ref 0.1–1.5)
BILIRUB UR QL STRIP.AUTO: NEGATIVE
BUN SERPL-MCNC: 9 MG/DL (ref 8–22)
CALCIUM ALBUM COR SERPL-MCNC: 8.4 MG/DL (ref 8.5–10.5)
CALCIUM SERPL-MCNC: 8 MG/DL (ref 8.5–10.5)
CHLORIDE SERPL-SCNC: 100 MMOL/L (ref 96–112)
CO2 SERPL-SCNC: 17 MMOL/L (ref 20–33)
COLOR UR AUTO: ABNORMAL
CREAT SERPL-MCNC: 0.62 MG/DL (ref 0.5–1.4)
ERYTHROCYTE [DISTWIDTH] IN BLOOD BY AUTOMATED COUNT: 39.8 FL (ref 35.9–50)
GFR SERPLBLD CREATININE-BSD FMLA CKD-EPI: 123 ML/MIN/1.73 M 2
GLOBULIN SER CALC-MCNC: 3.5 G/DL (ref 1.9–3.5)
GLUCOSE SERPL-MCNC: 112 MG/DL (ref 65–99)
GLUCOSE UR QL STRIP.AUTO: NEGATIVE MG/DL
HCT VFR BLD AUTO: 37.9 % (ref 37–47)
HGB BLD-MCNC: 12.9 G/DL (ref 12–16)
KETONES UR QL STRIP.AUTO: NEGATIVE MG/DL
LEUKOCYTE ESTERASE UR QL STRIP.AUTO: ABNORMAL
MAGNESIUM SERPL-MCNC: 5.4 MG/DL (ref 1.5–2.5)
MAGNESIUM SERPL-MCNC: 5.9 MG/DL (ref 1.5–2.5)
MAGNESIUM SERPL-MCNC: 6 MG/DL (ref 1.5–2.5)
MCH RBC QN AUTO: 29.7 PG (ref 27–33)
MCHC RBC AUTO-ENTMCNC: 34 G/DL (ref 32.2–35.5)
MCV RBC AUTO: 87.1 FL (ref 81.4–97.8)
NITRITE UR QL STRIP.AUTO: NEGATIVE
PH UR STRIP.AUTO: 7 [PH] (ref 5–8)
PLATELET # BLD AUTO: 304 K/UL (ref 164–446)
PMV BLD AUTO: 10.3 FL (ref 9–12.9)
POTASSIUM SERPL-SCNC: 3.8 MMOL/L (ref 3.6–5.5)
PROT SERPL-MCNC: 7 G/DL (ref 6–8.2)
PROT UR QL STRIP: NEGATIVE MG/DL
RBC # BLD AUTO: 4.35 M/UL (ref 4.2–5.4)
RBC UR QL AUTO: NEGATIVE
SODIUM SERPL-SCNC: 131 MMOL/L (ref 135–145)
SP GR UR STRIP.AUTO: 1.02 (ref 1–1.03)
UROBILINOGEN UR STRIP.AUTO-MCNC: 0.2 MG/DL
WBC # BLD AUTO: 25.5 K/UL (ref 4.8–10.8)

## 2024-12-05 PROCEDURE — 700102 HCHG RX REV CODE 250 W/ 637 OVERRIDE(OP): Performed by: OBSTETRICS & GYNECOLOGY

## 2024-12-05 PROCEDURE — 700111 HCHG RX REV CODE 636 W/ 250 OVERRIDE (IP): Mod: JZ | Performed by: ANESTHESIOLOGY

## 2024-12-05 PROCEDURE — 36415 COLL VENOUS BLD VENIPUNCTURE: CPT

## 2024-12-05 PROCEDURE — 83735 ASSAY OF MAGNESIUM: CPT | Mod: 91

## 2024-12-05 PROCEDURE — 700111 HCHG RX REV CODE 636 W/ 250 OVERRIDE (IP): Mod: JZ | Performed by: STUDENT IN AN ORGANIZED HEALTH CARE EDUCATION/TRAINING PROGRAM

## 2024-12-05 PROCEDURE — 85027 COMPLETE CBC AUTOMATED: CPT

## 2024-12-05 PROCEDURE — 700111 HCHG RX REV CODE 636 W/ 250 OVERRIDE (IP): Mod: JZ

## 2024-12-05 PROCEDURE — A9270 NON-COVERED ITEM OR SERVICE: HCPCS | Performed by: OBSTETRICS & GYNECOLOGY

## 2024-12-05 PROCEDURE — 700111 HCHG RX REV CODE 636 W/ 250 OVERRIDE (IP): Mod: JG | Performed by: STUDENT IN AN ORGANIZED HEALTH CARE EDUCATION/TRAINING PROGRAM

## 2024-12-05 PROCEDURE — 80053 COMPREHEN METABOLIC PANEL: CPT

## 2024-12-05 PROCEDURE — 770002 HCHG ROOM/CARE - OB PRIVATE (112)

## 2024-12-05 PROCEDURE — 700111 HCHG RX REV CODE 636 W/ 250 OVERRIDE (IP): Mod: JZ | Performed by: FAMILY MEDICINE

## 2024-12-05 PROCEDURE — 700101 HCHG RX REV CODE 250: Performed by: STUDENT IN AN ORGANIZED HEALTH CARE EDUCATION/TRAINING PROGRAM

## 2024-12-05 PROCEDURE — 700105 HCHG RX REV CODE 258: Performed by: FAMILY MEDICINE

## 2024-12-05 PROCEDURE — 700105 HCHG RX REV CODE 258: Performed by: ANESTHESIOLOGY

## 2024-12-05 RX ORDER — SODIUM CHLORIDE, SODIUM LACTATE, POTASSIUM CHLORIDE, AND CALCIUM CHLORIDE .6; .31; .03; .02 G/100ML; G/100ML; G/100ML; G/100ML
250 INJECTION, SOLUTION INTRAVENOUS PRN
Status: DISCONTINUED | OUTPATIENT
Start: 2024-12-05 | End: 2024-12-05

## 2024-12-05 RX ORDER — EPHEDRINE SULFATE 50 MG/ML
5 INJECTION, SOLUTION INTRAVENOUS
Status: DISCONTINUED | OUTPATIENT
Start: 2024-12-05 | End: 2024-12-06

## 2024-12-05 RX ORDER — LABETALOL 100 MG/1
200 TABLET, FILM COATED ORAL TWICE DAILY
Status: DISCONTINUED | OUTPATIENT
Start: 2024-12-05 | End: 2024-12-08

## 2024-12-05 RX ORDER — LIDOCAINE HYDROCHLORIDE AND EPINEPHRINE 15; 5 MG/ML; UG/ML
INJECTION, SOLUTION EPIDURAL
Status: COMPLETED | OUTPATIENT
Start: 2024-12-05 | End: 2024-12-05

## 2024-12-05 RX ORDER — CALCIUM CARBONATE 500 MG/1
500 TABLET, CHEWABLE ORAL DAILY
Status: DISCONTINUED | OUTPATIENT
Start: 2024-12-06 | End: 2024-12-05

## 2024-12-05 RX ORDER — EPHEDRINE SULFATE 50 MG/ML
5 INJECTION, SOLUTION INTRAVENOUS
Status: DISCONTINUED | OUTPATIENT
Start: 2024-12-05 | End: 2024-12-05

## 2024-12-05 RX ORDER — SODIUM CHLORIDE, SODIUM LACTATE, POTASSIUM CHLORIDE, AND CALCIUM CHLORIDE .6; .31; .03; .02 G/100ML; G/100ML; G/100ML; G/100ML
1000 INJECTION, SOLUTION INTRAVENOUS
Status: DISCONTINUED | OUTPATIENT
Start: 2024-12-05 | End: 2024-12-05

## 2024-12-05 RX ORDER — BUPIVACAINE HYDROCHLORIDE 2.5 MG/ML
INJECTION, SOLUTION EPIDURAL; INFILTRATION; INTRACAUDAL
Status: COMPLETED
Start: 2024-12-05 | End: 2024-12-05

## 2024-12-05 RX ORDER — LABETALOL 100 MG/1
200 TABLET, FILM COATED ORAL TWICE DAILY
Status: DISCONTINUED | OUTPATIENT
Start: 2024-12-05 | End: 2024-12-05

## 2024-12-05 RX ORDER — ROPIVACAINE HYDROCHLORIDE 2 MG/ML
INJECTION, SOLUTION EPIDURAL; INFILTRATION; PERINEURAL CONTINUOUS
Status: DISCONTINUED | OUTPATIENT
Start: 2024-12-05 | End: 2024-12-06 | Stop reason: ALTCHOICE

## 2024-12-05 RX ORDER — SODIUM CHLORIDE, SODIUM LACTATE, POTASSIUM CHLORIDE, AND CALCIUM CHLORIDE .6; .31; .03; .02 G/100ML; G/100ML; G/100ML; G/100ML
250 INJECTION, SOLUTION INTRAVENOUS PRN
Status: DISCONTINUED | OUTPATIENT
Start: 2024-12-05 | End: 2024-12-06

## 2024-12-05 RX ORDER — ROPIVACAINE HYDROCHLORIDE 2 MG/ML
INJECTION, SOLUTION EPIDURAL; INFILTRATION; PERINEURAL CONTINUOUS
Status: DISCONTINUED | OUTPATIENT
Start: 2024-12-05 | End: 2024-12-05

## 2024-12-05 RX ORDER — CALCIUM CARBONATE 500 MG/1
500 TABLET, CHEWABLE ORAL DAILY
Status: DISCONTINUED | OUTPATIENT
Start: 2024-12-05 | End: 2024-12-09 | Stop reason: HOSPADM

## 2024-12-05 RX ORDER — SODIUM CHLORIDE, SODIUM LACTATE, POTASSIUM CHLORIDE, AND CALCIUM CHLORIDE .6; .31; .03; .02 G/100ML; G/100ML; G/100ML; G/100ML
1000 INJECTION, SOLUTION INTRAVENOUS
Status: COMPLETED | OUTPATIENT
Start: 2024-12-05 | End: 2024-12-05

## 2024-12-05 RX ORDER — BUPIVACAINE HYDROCHLORIDE 2.5 MG/ML
INJECTION, SOLUTION EPIDURAL; INFILTRATION; INTRACAUDAL PRN
Status: DISCONTINUED | OUTPATIENT
Start: 2024-12-05 | End: 2024-12-06 | Stop reason: SURG

## 2024-12-05 RX ADMIN — FENTANYL CITRATE 100 MCG: 50 INJECTION, SOLUTION INTRAMUSCULAR; INTRAVENOUS at 04:20

## 2024-12-05 RX ADMIN — ROPIVACAINE HYDROCHLORIDE: 2 INJECTION, SOLUTION EPIDURAL; INFILTRATION at 15:57

## 2024-12-05 RX ADMIN — FENTANYL CITRATE 50 MCG: 50 INJECTION, SOLUTION INTRAMUSCULAR; INTRAVENOUS at 08:53

## 2024-12-05 RX ADMIN — LABETALOL HYDROCHLORIDE 200 MG: 100 TABLET, FILM COATED ORAL at 21:41

## 2024-12-05 RX ADMIN — MAGNESIUM SULFATE IN WATER 2 G/HR: 40 INJECTION, SOLUTION INTRAVENOUS at 07:42

## 2024-12-05 RX ADMIN — DINOPROSTONE 10 MG: 10 INSERT VAGINAL at 17:58

## 2024-12-05 RX ADMIN — LIDOCAINE HYDROCHLORIDE,EPINEPHRINE BITARTRATE 3 ML: 15; .005 INJECTION, SOLUTION EPIDURAL; INFILTRATION; INTRACAUDAL; PERINEURAL at 16:00

## 2024-12-05 RX ADMIN — SODIUM CHLORIDE, POTASSIUM CHLORIDE, SODIUM LACTATE AND CALCIUM CHLORIDE 1000 ML: 600; 310; 30; 20 INJECTION, SOLUTION INTRAVENOUS at 15:35

## 2024-12-05 RX ADMIN — MISOPROSTOL 25 MCG: 100 TABLET ORAL at 04:21

## 2024-12-05 RX ADMIN — SODIUM CHLORIDE, POTASSIUM CHLORIDE, SODIUM LACTATE AND CALCIUM CHLORIDE: 600; 310; 30; 20 INJECTION, SOLUTION INTRAVENOUS at 05:18

## 2024-12-05 RX ADMIN — NIFEDIPINE 10 MG: 10 CAPSULE ORAL at 07:46

## 2024-12-05 RX ADMIN — MISOPROSTOL 25 MCG: 100 TABLET ORAL at 11:12

## 2024-12-05 RX ADMIN — FENTANYL CITRATE 100 MCG: 50 INJECTION, SOLUTION INTRAMUSCULAR; INTRAVENOUS at 16:00

## 2024-12-05 RX ADMIN — ONDANSETRON 4 MG: 2 INJECTION INTRAMUSCULAR; INTRAVENOUS at 16:16

## 2024-12-05 RX ADMIN — BETAMETHASONE SODIUM PHOSPHATE AND BETAMETHASONE ACETATE 12 MG: 3; 3 INJECTION, SUSPENSION INTRA-ARTICULAR; INTRALESIONAL; INTRAMUSCULAR at 13:27

## 2024-12-05 RX ADMIN — BUPIVACAINE HYDROCHLORIDE 5 ML: 2.5 INJECTION, SOLUTION EPIDURAL; INFILTRATION; INTRACAUDAL at 16:00

## 2024-12-05 ASSESSMENT — PAIN DESCRIPTION - PAIN TYPE
TYPE: ACUTE PAIN

## 2024-12-05 NOTE — CARE PLAN
The patient is Stable - Low risk of patient condition declining or worsening    Shift Goals  Clinical Goals: Healthy mom, healthy baby  Patient Goals: Deliver a healthy baby  Family Goals: Support    Progress made toward(s) clinical / shift goals:    Problem: Knowledge Deficit - L&D  Goal: Patient and family/caregivers will demonstrate understanding of plan of care, disease process/condition, diagnostic tests and medications  Description: Target End Date:  1-3 days or as soon as patient condition allows    1.  Oriented to unit, equipment, visitation policy and means for communicating concern  2.  Complete/review Learning Assessment  3.  Assess patient's preparation, knowledge level and expectations  4.  Provide accurate information in basic terms, clarify misconceptions  5.  Explain disease process/condition, treatment plan, diagnostic tests and medications  6.  Encourage patient and support person to ask questions and verbalize their understanding  7.  Instruct patient/support person in basic interpretation of fetal monitor  8.  Obtain informed consent when appropriate  9.  Demonstrate breathing/relaxation techniques  Outcome: Progressing     Problem: Pain  Goal: Patient's pain will be alleviated or reduced to the patient’s comfort goal  Description: Target End Date:  Prior to discharge or change in level of care    1.  Document pain using the appropriate pain scale per order or unit policy  2.  Administer pain medications per provider order and/or assist with epidural/spinal placement as needed  3.  Pain management medications as ordered  4.  Educate and implement non-pharmacologic comfort measures (i.e. relaxation, distraction, massage, cold/heat therapy, etc.)  5.  Assess for nonverbal signs of ineffective coping with pain and offer pain medication and/or epidural anesthesia  12/5/2024 0945 by Allyson Mendez RLOLITA.  Outcome: Progressing  12/5/2024 0943 by Allyson Mendez R.N.  Outcome: Progressing  Note: Pt  continuously monitored for pain, educated on pain management options.  Call light within reach, pt understands to call RN regarding any concerns     Problem: Risk for Infection and Impaired Wound Healing  Goal: Patient will remain free from infection  Description: Target End Date:  1 to 3 days    1.  Utilize Standard Precautions at all times to reduce the risk of transmission of microorganisms from both recognized and unrecognized sources of infection  2.  Infection prevention handouts provided (general/device/diagnosis specific) and documented in Patient Education  3.  Limit vaginal exams as necessary  4.  Use aseptic technique during vaginal exams  5.  Administer antibiotic therapy per provider order  6.  Assess for removal of lines and drains  7.  Line/drain care per policy and/or provider orders  Outcome: Progressing  Note: Patient and support educated on infection prevention.  Patient monitored for signs and symptoms of infection.

## 2024-12-05 NOTE — PROGRESS NOTES
"S: Patient is feeling occasional contractions.  She is sleepy.  No other complaints    O:/77   Pulse 90   Temp 36.9 °C (98.4 °F) (Temporal)   Resp 18   Ht 1.549 m (5' 1\")   Wt 75.8 kg (167 lb)   SpO2 97%      [place] - q 5 min  EFM - 140s, moderate variability, + accels  Cx - Closed/thick/long per nurse check    Reflexes 3+, no clonus.  Urine output adequate per nurse.    A/P: 29-year-old G1, P0 at 32-4/7 weeks admitted for preeclampsia with severe features.  Induction recommended per perinatology.  Cervix is still unfavorable after 4 doses of 25 mcg of Cytotec.  Plan to switch to oral administration.  Patient administered 25 mcg of Cytotec p.o.    Patient is on magnesium sulfate at 2 g/h for seizure prophylaxis without signs or symptoms of magnesium sulfate toxicity.    Epidural anesthesia encouraged.  Plan to attempt to place Cook's catheter at next check.      "

## 2024-12-05 NOTE — CARE PLAN
The patient is Watcher - Medium risk of patient condition declining or worsening    Shift Goals  Clinical Goals: Safe progression of labor  Patient Goals: Healthy mom, healthy baby  Family Goals: Support    Progress made toward(s) clinical / shift goals:      Problem: Knowledge Deficit - L&D  Goal: Patient and family/caregivers will demonstrate understanding of plan of care, disease process/condition, diagnostic tests and medications  Outcome: Progressing  Note: Patient updated on plan of care. All questions answered. Patient verbalized understanding. No further questions at this time.         Patient is not progressing towards the following goals:

## 2024-12-05 NOTE — PROGRESS NOTES
"S: Patient is resting comfortably. Reports feeling contractions, rates them as a 3/10. Denies HA, blurry vision, RUQ pain. Reports feeling some lightheadedness.      O: /56   Pulse 81   Temp 36.6 °C (97.8 °F) (Temporal)   Resp 18   Ht 1.549 m (5' 1\")   Wt 75.8 kg (167 lb)   LMP 2024   SpO2 96%   BMI 31.55 kg/m²            FHTs:  Baseline 145, variability: moderate, accels: present, decels: absent        [place]: Contractions q2-3 minutes previously, have sine decreased to infrequent with some uterine irritability         SVE: ft/thick/high; very posterior     A/P:    1.  IUP @ 32w3d  2.  Cat 1-2 FHTs    3.  Will continue with induction. Pt had increased contractions after cervical check, so will monitor prior to administering another ripening agent. Will use cytotec if contractions decrease.  4.  PreE with severe features  -BP currently 110-130/50-60  -On magnesium sulfate for seizure prophylaxis, last magnesium level 5.3  5.  Anticipate     Lily Vitale CNM   "

## 2024-12-05 NOTE — PROGRESS NOTES
1900 - Received report from Vika MCCLELLAND.     1275 - BUSTER Vitale aware of FHR decels.     0200 - BUSTER Vitale notified of FHR decels. Orders for 500 LR bolus.    0395 - BUSTER Vitale at bedside for cytotec placement.

## 2024-12-05 NOTE — PROGRESS NOTES
0700- Report received from KRYSTIN Denson.  POC discussed with pt and support.  Pt reeducated on magnesium drip and precautions.      0905- SVE completed, cl/th/-1.  Dr Mccarthy updated on pt status.      1110- Dr Mccarthy at bedside, see MAR.      1535- Dr Maya called to bedside for epidural placement.    1547- Time out completed for epidural.  Following placement of epidural pt reported feeling more relaxed with better pain management.      1625- Dr Mccarthy at bedside for late decels, SVE completed, cl/th/high.  .    1720- Dr Mccarthy at bedside to discuss POC.      1900- Report given to KRYSTIN Sharif, care relinquished.

## 2024-12-05 NOTE — PROGRESS NOTES
"S: Patient is resting comfortably between cervical exams, reports feeling contractions though able to rest/sleep through them.      O: BP (!) 141/78   Pulse 100   Temp 36.4 °C (97.6 °F) (Oral)   Resp 18   Ht 1.549 m (5' 1\")   Wt 75.8 kg (167 lb)   LMP 2024   SpO2 96%   BMI 31.55 kg/m²            FHTs:  Baseline 130, variability: moderate with periods of minimal, accels: none, decels: occasional late decelerations,         [place]: Contractions q 2-5 minutes, soft to palpation        SVE: c/t/h, very posterior--pt not able to tolerate SVE with this exam     A/P:    1.  IUP @ 32w3d  2.  Cat 1-2 FHTs    3.  Will continue with induction. Discussed pt with MD Karina, who recommended that we repeat a dose of cytotec to continue with cervical ripening agent.   4.  PreE with severe features  -BP currently 110-140/60-80  -On magnesium sulfate for seizure prophylaxis, last magnesium level 5.3; next level check due   5.  Anticipate        Lily Vitale CNM    "

## 2024-12-05 NOTE — CONSULTS
Asked to talk to this young lady about possible delivery @ 32-3/7 weeks gestation.  Mother is 29 Y.o.  who presents with Pre-E    Mother was told about possible RDS and the possible need for respiratory support anything from Oxygen to intubation and mechanical ventilation. She was told about surfactant replacement. She was told about possible CLD which could cause the baby to require ventilation for a prolonged period of time.  Mother was told about a possible PDA which could be treated medically or surgically.  Mother was told about the need for gavage feeds initially and about possible feeding intolerance. She was told about possible NEC, which could be treated medically or surgically.  Mother was told about possible IVH, and possible brain injury causing developmental delay. She was told about possible MRCP and MBD.  Mother was told about possible ROP which could require laser surgery  Mother was told that the baby will likely stay in the NICU unti the due date but that will depend on the clinical course.    If she has any other questions, please let us know and we can talk to her again    Juanito Kang MD  Staff Neonatologist  [facility]

## 2024-12-05 NOTE — CARE PLAN
Problem: Knowledge Deficit - L&D  Goal: Patient and family/caregivers will demonstrate understanding of plan of care, disease process/condition, diagnostic tests and medications  Outcome: Progressing  Note: Patient encouraged to ask questions regarding care. Informed consent obtained for all procedures, exams, and medication administrations.        Problem: Risk for Injury  Goal: Patient and fetus will be free of preventable injury/complications  Outcome: Progressing  Flowsheets  Taken 12/4/2024 2200 by Janiya Alaniz RGABRIELLA  Mode: External [place]  Contraction Frequency: 1-5  Taken 12/4/2024 1800 by Diana Leon R.N.  Resting Tone Palpated: Soft  Note: EFM and TOCO in place for continuous maternal and fetal monitoring. FHR  & UC's documented and reviewed hourly.       Problem: Pain  Goal: Patient's pain will be alleviated or reduced to the patient’s comfort goal  Flowsheets  Taken 12/4/2024 2200 by Janiya Alaniz R.N.  OB Pain Level: 0-No Pain  Taken 12/4/2024 1900 by Diana Leon R.N.  OB Pain Intervention: Declines  Taken 12/4/2024 0940 by Jill Velasquez R.N.  Pain Rating Scale (NPRS): 0  Note: Pain assessed hourly. Patient stating she would like to have an unmedicated birth. Patient okay with IV pain medication to be able to tolerate SVE.      The patient is Watcher - Medium risk of patient condition declining or worsening    Shift Goals  Clinical Goals: Safe delivery and labor progression  Patient Goals: Healthy baby  Family Goals: Support    Progress made toward(s) clinical / shift goals:      Patient is not progressing towards the following goals:

## 2024-12-06 LAB
MAGNESIUM SERPL-MCNC: 3.2 MG/DL (ref 1.5–2.5)
MAGNESIUM SERPL-MCNC: 3.2 MG/DL (ref 1.5–2.5)
MAGNESIUM SERPL-MCNC: 3.6 MG/DL (ref 1.5–2.5)
PATHOLOGY CONSULT NOTE: NORMAL

## 2024-12-06 PROCEDURE — 700101 HCHG RX REV CODE 250: Performed by: ANESTHESIOLOGY

## 2024-12-06 PROCEDURE — A9270 NON-COVERED ITEM OR SERVICE: HCPCS | Performed by: OBSTETRICS & GYNECOLOGY

## 2024-12-06 PROCEDURE — 36415 COLL VENOUS BLD VENIPUNCTURE: CPT

## 2024-12-06 PROCEDURE — 700102 HCHG RX REV CODE 250 W/ 637 OVERRIDE(OP): Performed by: OBSTETRICS & GYNECOLOGY

## 2024-12-06 PROCEDURE — 700111 HCHG RX REV CODE 636 W/ 250 OVERRIDE (IP): Mod: JZ | Performed by: ANESTHESIOLOGY

## 2024-12-06 PROCEDURE — 700111 HCHG RX REV CODE 636 W/ 250 OVERRIDE (IP): Mod: JZ | Performed by: OBSTETRICS & GYNECOLOGY

## 2024-12-06 PROCEDURE — 700111 HCHG RX REV CODE 636 W/ 250 OVERRIDE (IP): Performed by: STUDENT IN AN ORGANIZED HEALTH CARE EDUCATION/TRAINING PROGRAM

## 2024-12-06 PROCEDURE — 59514 CESAREAN DELIVERY ONLY: CPT | Mod: AS | Performed by: NURSE PRACTITIONER

## 2024-12-06 PROCEDURE — 160046 HCHG PACU - 1ST 60 MINS PHASE II: Performed by: STUDENT IN AN ORGANIZED HEALTH CARE EDUCATION/TRAINING PROGRAM

## 2024-12-06 PROCEDURE — 160048 HCHG OR STATISTICAL LEVEL 1-5: Performed by: STUDENT IN AN ORGANIZED HEALTH CARE EDUCATION/TRAINING PROGRAM

## 2024-12-06 PROCEDURE — A9270 NON-COVERED ITEM OR SERVICE: HCPCS | Performed by: ANESTHESIOLOGY

## 2024-12-06 PROCEDURE — 160029 HCHG SURGERY MINUTES - 1ST 30 MINS LEVEL 4: Performed by: STUDENT IN AN ORGANIZED HEALTH CARE EDUCATION/TRAINING PROGRAM

## 2024-12-06 PROCEDURE — 83735 ASSAY OF MAGNESIUM: CPT

## 2024-12-06 PROCEDURE — 700111 HCHG RX REV CODE 636 W/ 250 OVERRIDE (IP): Performed by: FAMILY MEDICINE

## 2024-12-06 PROCEDURE — C1755 CATHETER, INTRASPINAL: HCPCS | Performed by: STUDENT IN AN ORGANIZED HEALTH CARE EDUCATION/TRAINING PROGRAM

## 2024-12-06 PROCEDURE — 700105 HCHG RX REV CODE 258: Performed by: ANESTHESIOLOGY

## 2024-12-06 PROCEDURE — 700102 HCHG RX REV CODE 250 W/ 637 OVERRIDE(OP): Performed by: ANESTHESIOLOGY

## 2024-12-06 PROCEDURE — A9270 NON-COVERED ITEM OR SERVICE: HCPCS | Performed by: STUDENT IN AN ORGANIZED HEALTH CARE EDUCATION/TRAINING PROGRAM

## 2024-12-06 PROCEDURE — 700105 HCHG RX REV CODE 258: Performed by: FAMILY MEDICINE

## 2024-12-06 PROCEDURE — 160035 HCHG PACU - 1ST 60 MINS PHASE I: Performed by: STUDENT IN AN ORGANIZED HEALTH CARE EDUCATION/TRAINING PROGRAM

## 2024-12-06 PROCEDURE — 88307 TISSUE EXAM BY PATHOLOGIST: CPT

## 2024-12-06 PROCEDURE — 160041 HCHG SURGERY MINUTES - EA ADDL 1 MIN LEVEL 4: Performed by: STUDENT IN AN ORGANIZED HEALTH CARE EDUCATION/TRAINING PROGRAM

## 2024-12-06 PROCEDURE — 59510 CESAREAN DELIVERY: CPT | Performed by: STUDENT IN AN ORGANIZED HEALTH CARE EDUCATION/TRAINING PROGRAM

## 2024-12-06 PROCEDURE — 160047 HCHG PACU  - EA ADDL 30 MINS PHASE II: Performed by: STUDENT IN AN ORGANIZED HEALTH CARE EDUCATION/TRAINING PROGRAM

## 2024-12-06 PROCEDURE — 770002 HCHG ROOM/CARE - OB PRIVATE (112)

## 2024-12-06 PROCEDURE — 160002 HCHG RECOVERY MINUTES (STAT): Performed by: STUDENT IN AN ORGANIZED HEALTH CARE EDUCATION/TRAINING PROGRAM

## 2024-12-06 PROCEDURE — 160025 RECOVERY II MINUTES (STATS): Performed by: STUDENT IN AN ORGANIZED HEALTH CARE EDUCATION/TRAINING PROGRAM

## 2024-12-06 PROCEDURE — 700105 HCHG RX REV CODE 258: Performed by: OBSTETRICS & GYNECOLOGY

## 2024-12-06 PROCEDURE — 700111 HCHG RX REV CODE 636 W/ 250 OVERRIDE (IP): Performed by: ANESTHESIOLOGY

## 2024-12-06 PROCEDURE — 160009 HCHG ANES TIME/MIN: Performed by: STUDENT IN AN ORGANIZED HEALTH CARE EDUCATION/TRAINING PROGRAM

## 2024-12-06 PROCEDURE — 303615 HCHG EPIDURAL/SPINAL ANESTHESIA FOR LABOR

## 2024-12-06 PROCEDURE — 700102 HCHG RX REV CODE 250 W/ 637 OVERRIDE(OP): Performed by: STUDENT IN AN ORGANIZED HEALTH CARE EDUCATION/TRAINING PROGRAM

## 2024-12-06 RX ORDER — SODIUM CHLORIDE, SODIUM LACTATE, POTASSIUM CHLORIDE, CALCIUM CHLORIDE 600; 310; 30; 20 MG/100ML; MG/100ML; MG/100ML; MG/100ML
2000 INJECTION, SOLUTION INTRAVENOUS PRN
Status: DISCONTINUED | OUTPATIENT
Start: 2024-12-06 | End: 2024-12-09 | Stop reason: HOSPADM

## 2024-12-06 RX ORDER — LIDOCAINE HCL/EPINEPHRINE/PF 2%-1:200K
VIAL (ML) INJECTION PRN
Status: DISCONTINUED | OUTPATIENT
Start: 2024-12-06 | End: 2024-12-06 | Stop reason: SURG

## 2024-12-06 RX ORDER — ONDANSETRON 2 MG/ML
4 INJECTION INTRAMUSCULAR; INTRAVENOUS EVERY 6 HOURS PRN
Status: DISCONTINUED | OUTPATIENT
Start: 2024-12-07 | End: 2024-12-09 | Stop reason: HOSPADM

## 2024-12-06 RX ORDER — CALCIUM GLUCONATE 94 MG/ML
1 INJECTION, SOLUTION INTRAVENOUS
Status: DISCONTINUED | OUTPATIENT
Start: 2024-12-06 | End: 2024-12-09 | Stop reason: HOSPADM

## 2024-12-06 RX ORDER — SODIUM CHLORIDE, SODIUM LACTATE, POTASSIUM CHLORIDE, AND CALCIUM CHLORIDE .6; .31; .03; .02 G/100ML; G/100ML; G/100ML; G/100ML
1000 INJECTION, SOLUTION INTRAVENOUS ONCE
Status: DISCONTINUED | OUTPATIENT
Start: 2024-12-06 | End: 2024-12-06

## 2024-12-06 RX ORDER — MAGNESIUM SULFATE HEPTAHYDRATE 40 MG/ML
4 INJECTION, SOLUTION INTRAVENOUS ONCE
Status: COMPLETED | OUTPATIENT
Start: 2024-12-06 | End: 2024-12-06

## 2024-12-06 RX ORDER — OXYCODONE HYDROCHLORIDE 5 MG/1
10 TABLET ORAL EVERY 4 HOURS PRN
Status: DISCONTINUED | OUTPATIENT
Start: 2024-12-07 | End: 2024-12-09 | Stop reason: HOSPADM

## 2024-12-06 RX ORDER — SODIUM CHLORIDE, SODIUM GLUCONATE, SODIUM ACETATE, POTASSIUM CHLORIDE AND MAGNESIUM CHLORIDE 526; 502; 368; 37; 30 MG/100ML; MG/100ML; MG/100ML; MG/100ML; MG/100ML
INJECTION, SOLUTION INTRAVENOUS
Status: DISCONTINUED | OUTPATIENT
Start: 2024-12-06 | End: 2024-12-06 | Stop reason: SURG

## 2024-12-06 RX ORDER — HYDROMORPHONE HYDROCHLORIDE 1 MG/ML
0.2 INJECTION, SOLUTION INTRAMUSCULAR; INTRAVENOUS; SUBCUTANEOUS
Status: DISPENSED | OUTPATIENT
Start: 2024-12-06 | End: 2024-12-07

## 2024-12-06 RX ORDER — ONDANSETRON 4 MG/1
4 TABLET, ORALLY DISINTEGRATING ORAL EVERY 6 HOURS PRN
Status: DISCONTINUED | OUTPATIENT
Start: 2024-12-07 | End: 2024-12-09 | Stop reason: HOSPADM

## 2024-12-06 RX ORDER — MAGNESIUM SULFATE HEPTAHYDRATE 40 MG/ML
INJECTION, SOLUTION INTRAVENOUS
Status: ACTIVE
Start: 2024-12-06 | End: 2024-12-06

## 2024-12-06 RX ORDER — METOCLOPRAMIDE HYDROCHLORIDE 5 MG/ML
10 INJECTION INTRAMUSCULAR; INTRAVENOUS ONCE
Status: COMPLETED | OUTPATIENT
Start: 2024-12-06 | End: 2024-12-06

## 2024-12-06 RX ORDER — CITRIC ACID/SODIUM CITRATE 334-500MG
30 SOLUTION, ORAL ORAL ONCE
Status: COMPLETED | OUTPATIENT
Start: 2024-12-06 | End: 2024-12-06

## 2024-12-06 RX ORDER — DIPHENHYDRAMINE HCL 25 MG
25 TABLET ORAL EVERY 6 HOURS PRN
Status: DISCONTINUED | OUTPATIENT
Start: 2024-12-06 | End: 2024-12-09 | Stop reason: HOSPADM

## 2024-12-06 RX ORDER — OXYCODONE HYDROCHLORIDE 5 MG/1
5 TABLET ORAL EVERY 4 HOURS PRN
Status: DISCONTINUED | OUTPATIENT
Start: 2024-12-07 | End: 2024-12-09 | Stop reason: HOSPADM

## 2024-12-06 RX ORDER — MAGNESIUM SULFATE HEPTAHYDRATE 40 MG/ML
1 INJECTION, SOLUTION INTRAVENOUS CONTINUOUS
Status: ACTIVE | OUTPATIENT
Start: 2024-12-06 | End: 2024-12-07

## 2024-12-06 RX ORDER — IBUPROFEN 800 MG/1
800 TABLET, FILM COATED ORAL EVERY 8 HOURS PRN
Status: DISCONTINUED | OUTPATIENT
Start: 2024-12-10 | End: 2024-12-06

## 2024-12-06 RX ORDER — CEFAZOLIN SODIUM 1 G/3ML
INJECTION, POWDER, FOR SOLUTION INTRAMUSCULAR; INTRAVENOUS PRN
Status: DISCONTINUED | OUTPATIENT
Start: 2024-12-06 | End: 2024-12-06 | Stop reason: SURG

## 2024-12-06 RX ORDER — ACETAMINOPHEN 500 MG
1000 TABLET ORAL EVERY 6 HOURS
Status: DISCONTINUED | OUTPATIENT
Start: 2024-12-07 | End: 2024-12-09 | Stop reason: HOSPADM

## 2024-12-06 RX ORDER — DIPHENHYDRAMINE HYDROCHLORIDE 50 MG/ML
25 INJECTION INTRAMUSCULAR; INTRAVENOUS EVERY 6 HOURS PRN
Status: DISCONTINUED | OUTPATIENT
Start: 2024-12-06 | End: 2024-12-09 | Stop reason: HOSPADM

## 2024-12-06 RX ORDER — DOCUSATE SODIUM 100 MG/1
100 CAPSULE, LIQUID FILLED ORAL 2 TIMES DAILY PRN
Status: DISCONTINUED | OUTPATIENT
Start: 2024-12-06 | End: 2024-12-09 | Stop reason: HOSPADM

## 2024-12-06 RX ORDER — OXYTOCIN 10 [USP'U]/ML
INJECTION, SOLUTION INTRAMUSCULAR; INTRAVENOUS PRN
Status: DISCONTINUED | OUTPATIENT
Start: 2024-12-06 | End: 2024-12-06 | Stop reason: SURG

## 2024-12-06 RX ORDER — CALCIUM GLUCONATE 94 MG/ML
1 INJECTION, SOLUTION INTRAVENOUS
Status: DISCONTINUED | OUTPATIENT
Start: 2024-12-06 | End: 2024-12-06 | Stop reason: HOSPADM

## 2024-12-06 RX ORDER — HYDROMORPHONE HYDROCHLORIDE 1 MG/ML
0.4 INJECTION, SOLUTION INTRAMUSCULAR; INTRAVENOUS; SUBCUTANEOUS
Status: ACTIVE | OUTPATIENT
Start: 2024-12-06 | End: 2024-12-07

## 2024-12-06 RX ORDER — TRANEXAMIC ACID 100 MG/ML
INJECTION, SOLUTION INTRAVENOUS PRN
Status: DISCONTINUED | OUTPATIENT
Start: 2024-12-06 | End: 2024-12-06 | Stop reason: SURG

## 2024-12-06 RX ORDER — MORPHINE SULFATE 0.5 MG/ML
INJECTION, SOLUTION EPIDURAL; INTRATHECAL; INTRAVENOUS PRN
Status: DISCONTINUED | OUTPATIENT
Start: 2024-12-06 | End: 2024-12-06 | Stop reason: SURG

## 2024-12-06 RX ORDER — IBUPROFEN 800 MG/1
800 TABLET, FILM COATED ORAL EVERY 8 HOURS
Status: DISCONTINUED | OUTPATIENT
Start: 2024-12-07 | End: 2024-12-06

## 2024-12-06 RX ORDER — OXYCODONE HYDROCHLORIDE 5 MG/1
5 TABLET ORAL EVERY 4 HOURS PRN
Status: ACTIVE | OUTPATIENT
Start: 2024-12-06 | End: 2024-12-07

## 2024-12-06 RX ORDER — ACETAMINOPHEN 500 MG
1000 TABLET ORAL EVERY 6 HOURS
Status: DISPENSED | OUTPATIENT
Start: 2024-12-06 | End: 2024-12-07

## 2024-12-06 RX ORDER — OXYCODONE HYDROCHLORIDE 5 MG/1
10 TABLET ORAL EVERY 4 HOURS PRN
Status: DISPENSED | OUTPATIENT
Start: 2024-12-06 | End: 2024-12-07

## 2024-12-06 RX ORDER — ACETAMINOPHEN 500 MG
1000 TABLET ORAL EVERY 6 HOURS PRN
Status: DISCONTINUED | OUTPATIENT
Start: 2024-12-10 | End: 2024-12-09 | Stop reason: HOSPADM

## 2024-12-06 RX ORDER — POLYETHYLENE GLYCOL 3350 17 G/17G
1 POWDER, FOR SOLUTION ORAL DAILY
Status: DISCONTINUED | OUTPATIENT
Start: 2024-12-06 | End: 2024-12-09 | Stop reason: HOSPADM

## 2024-12-06 RX ADMIN — SODIUM CHLORIDE, POTASSIUM CHLORIDE, SODIUM LACTATE AND CALCIUM CHLORIDE 1000 ML: 600; 310; 30; 20 INJECTION, SOLUTION INTRAVENOUS at 07:00

## 2024-12-06 RX ADMIN — TERBUTALINE SULFATE 0.25 MG: 1 INJECTION, SOLUTION SUBCUTANEOUS at 05:37

## 2024-12-06 RX ADMIN — SODIUM CHLORIDE, SODIUM GLUCONATE, SODIUM ACETATE, POTASSIUM CHLORIDE AND MAGNESIUM CHLORIDE: 526; 502; 368; 37; 30 INJECTION, SOLUTION INTRAVENOUS at 07:41

## 2024-12-06 RX ADMIN — SODIUM CITRATE AND CITRIC ACID MONOHYDRATE 30 ML: 334; 500 SOLUTION ORAL at 07:28

## 2024-12-06 RX ADMIN — ROPIVACAINE HYDROCHLORIDE: 2 INJECTION, SOLUTION EPIDURAL; INFILTRATION at 00:11

## 2024-12-06 RX ADMIN — OXYCODONE HYDROCHLORIDE 10 MG: 10 TABLET ORAL at 09:37

## 2024-12-06 RX ADMIN — CEFAZOLIN 2 G: 1 INJECTION, POWDER, FOR SOLUTION INTRAMUSCULAR; INTRAVENOUS at 07:46

## 2024-12-06 RX ADMIN — FAMOTIDINE 20 MG: 10 INJECTION, SOLUTION INTRAVENOUS at 07:28

## 2024-12-06 RX ADMIN — SODIUM CHLORIDE, POTASSIUM CHLORIDE, SODIUM LACTATE AND CALCIUM CHLORIDE: 600; 310; 30; 20 INJECTION, SOLUTION INTRAVENOUS at 20:10

## 2024-12-06 RX ADMIN — OXYCODONE HYDROCHLORIDE 10 MG: 10 TABLET ORAL at 17:27

## 2024-12-06 RX ADMIN — LIDOCAINE HYDROCHLORIDE,EPINEPHRINE BITARTRATE 5 ML: 20; .005 INJECTION, SOLUTION EPIDURAL; INFILTRATION; INTRACAUDAL; PERINEURAL at 07:42

## 2024-12-06 RX ADMIN — HYDROMORPHONE HYDROCHLORIDE 0.2 MG: 1 INJECTION, SOLUTION INTRAMUSCULAR; INTRAVENOUS; SUBCUTANEOUS at 12:08

## 2024-12-06 RX ADMIN — POLYETHYLENE GLYCOL 3350 1 PACKET: 17 POWDER, FOR SOLUTION ORAL at 17:43

## 2024-12-06 RX ADMIN — LABETALOL HYDROCHLORIDE 200 MG: 100 TABLET, FILM COATED ORAL at 17:43

## 2024-12-06 RX ADMIN — ONDANSETRON 4 MG: 2 INJECTION INTRAMUSCULAR; INTRAVENOUS at 09:07

## 2024-12-06 RX ADMIN — ACETAMINOPHEN 1000 MG: 500 TABLET ORAL at 17:27

## 2024-12-06 RX ADMIN — MAGNESIUM SULFATE IN WATER 1 G/HR: 40 INJECTION, SOLUTION INTRAVENOUS at 09:48

## 2024-12-06 RX ADMIN — METOCLOPRAMIDE HYDROCHLORIDE 10 MG: 5 INJECTION INTRAMUSCULAR; INTRAVENOUS at 07:28

## 2024-12-06 RX ADMIN — MAGNESIUM SULFATE IN WATER FOR 4 G: 40 INJECTION INTRAVENOUS at 09:13

## 2024-12-06 RX ADMIN — TRANEXAMIC ACID 1000 MG: 100 INJECTION, SOLUTION INTRAVENOUS at 08:20

## 2024-12-06 RX ADMIN — MORPHINE SULFATE 3 MG: 0.5 INJECTION, SOLUTION EPIDURAL; INTRATHECAL; INTRAVENOUS at 08:05

## 2024-12-06 RX ADMIN — LIDOCAINE HYDROCHLORIDE,EPINEPHRINE BITARTRATE 2 ML: 20; .005 INJECTION, SOLUTION EPIDURAL; INFILTRATION; INTRACAUDAL; PERINEURAL at 07:46

## 2024-12-06 RX ADMIN — OXYTOCIN 30 UNITS: 10 INJECTION, SOLUTION INTRAMUSCULAR; INTRAVENOUS at 07:54

## 2024-12-06 RX ADMIN — ACETAMINOPHEN 1000 MG: 500 TABLET ORAL at 23:22

## 2024-12-06 ASSESSMENT — PAIN DESCRIPTION - PAIN TYPE
TYPE: ACUTE PAIN
TYPE: ACUTE PAIN;SURGICAL PAIN
TYPE: ACUTE PAIN
TYPE: ACUTE PAIN;SURGICAL PAIN
TYPE: ACUTE PAIN
TYPE: ACUTE PAIN;SURGICAL PAIN
TYPE: ACUTE PAIN

## 2024-12-06 ASSESSMENT — LIFESTYLE VARIABLES
HAVE PEOPLE ANNOYED YOU BY CRITICIZING YOUR DRINKING: NO
ALCOHOL_USE: NO
HAVE YOU EVER FELT YOU SHOULD CUT DOWN ON YOUR DRINKING: NO
TOTAL SCORE: 0
TOTAL SCORE: 0
CONSUMPTION TOTAL: INCOMPLETE
EVER HAD A DRINK FIRST THING IN THE MORNING TO STEADY YOUR NERVES TO GET RID OF A HANGOVER: NO
TOTAL SCORE: 0
EVER FELT BAD OR GUILTY ABOUT YOUR DRINKING: NO

## 2024-12-06 ASSESSMENT — PAIN SCALES - GENERAL: PAIN_LEVEL: 0

## 2024-12-06 NOTE — PROGRESS NOTES
Case discussed with Dr. Amos from perinatology.    Blood pressures have been in the normal range for several hours, despite no scheduled antihypertensive medication.  Patient denies headache or visual changes.  Reflexes are 3+, without clonus.  Laboratory analysis is within normal limits, with the exclusion elevated PC ratio.    The patient has received 5 doses of cytotec, and cervix remains closed and thick.  Cervidil was placed at 6 PM.  The patient is currently allyson approximately 4-5 times per hour.    Dr. Amos has recommended discontinuation of the magnesium sulfate.    Chart reviewed and elevated blood pressures are noted at nearly every visit, consistent with chronic hypertension.  She was never started on any medications.  We will plan to start labetalol 200 mg p.o. twice daily and observe pressures overnight    The Cervidil will be left in place and cervix will be rechecked at 6 AM.

## 2024-12-06 NOTE — PROGRESS NOTES
"S: Patient resting comfortably with epidural.  No headache or visual changes.    O:/68   Pulse 95   Temp 36.8 °C (98.2 °F) (Temporal)   Resp 16   Ht 1.549 m (5' 1\")   Wt 75.8 kg (167 lb)   SpO2 95%      [place] - q 2 min  EFM - 140s ,moderate variability  Cx - closed/50%/-3/firm/anterior    A/P: 29-year-old G1, P0 at 32-5/7 weeks admitted for diagnosis of preeclampsia with severe features and induction of labor recommended.  Cervix was very unfavorable, patient received multiple doses of Cytotec and subsequently Cervidil.  Despite multiple ripening attempts, the cervix remains closed.    Cervidil was removed at approximately 5 AM after 11 hours due to decreasing variability and late decelerations noted associated with tachysystole.  Late decelerations continued, and terbutaline administered.    Blood pressures have remained normal overnight, with the exception of 1 spike to 171/85 when patient was anxious.    Case discussed with Dr. Amos from perinatology.  Late decelerations are noted intermittently in the absence of tachysystole, and he recommends proceeding with  delivery.    Discussed with the patient indications for  delivery. The patient voiced understanding of indications for  section at this time.    Discussed with the patient the risks of  delivery. The risks include bleeding, infection, transfusion, emergency hysterectomy to control bleeding, damage to surrounding organs (bowel, bladder, ureters, nerves, vessels), need for repair or future surgery, fetal injury, unexpected pathology, anesthesia risks, and rarely death.  I also discussed with the patient the risk of wound infection, wound breakdown, and scarring. We discussed that these risks are greater in people that have diabetes or obesity.    The patient had the opportunity to ask questions regarding the procedure. All questions answered to the patient's satisfaction. Plan to proceed with  delivery.    "

## 2024-12-06 NOTE — PROGRESS NOTES
"S: Patient comfortable with epidural.     O:BP (!) 146/73   Pulse 90   Temp 36.9 °C (98.5 °F) (Temporal)   Resp 18   Ht 1.549 m (5' 1\")   Wt 75.8 kg (167 lb)   SpO2 96%      [place] - q 5 min  EFM - 150s, minimal variability, late decelerations  Cx - closed/50%    A/P: 30 yo  @ 32+4/7 weeks here for IOL due to pre-eclampsia with severe features.  Late decels with minimal variability after epidural placement.  Pt was positioned flat on her back for tarango insertion.    IVF bolus ordered and patient repositioned to left side.  Continue to monitor closely.  "

## 2024-12-06 NOTE — ANESTHESIA PREPROCEDURE EVALUATION
Anesthesia Start Date/Time: 12/05/24 4863  Procedure: Labor Epidural         Relevant Problems   ANESTHESIA (within normal limits)      PULMONARY (within normal limits)      NEURO (within normal limits)      CARDIAC   (positive) Pre-eclampsia during pregnancy in third trimester, antepartum      GI (within normal limits)       (within normal limits)      ENDO (within normal limits)      OB   (positive) Pre-eclampsia during pregnancy in third trimester, antepartum       Physical Exam    Airway   Mallampati: II  TM distance: >3 FB  Neck ROM: full       Cardiovascular - normal exam  Rhythm: regular  Rate: normal  (-) murmur     Dental - normal exam           Pulmonary - normal exam  Breath sounds clear to auscultation     Abdominal    Neurological - normal exam                   Anesthesia Plan    ASA 2       Plan - epidural   Neuraxial block will be labor analgesia                  Pertinent diagnostic labs and testing reviewed    Informed Consent:    Anesthetic plan and risks discussed with patient.

## 2024-12-06 NOTE — CARE PLAN
The patient is Stable - Low risk of patient condition declining or worsening    Shift Goals  Clinical Goals: VSS; Lochia WDL; Pain WDL  Patient Goals: Deliver a healthy baby  Family Goals: Support    Progress made toward(s) clinical / shift goals:    Problem: Knowledge Deficit - Postpartum  Goal: Patient will verbalize and demonstrate understanding of self and infant care  Outcome: Progressing     Problem: Psychosocial - Postpartum  Goal: Patient will verbalize and demonstrate effective bonding and parenting behavior  Outcome: Progressing     Problem: Altered Physiologic Condition  Goal: Patient physiologically stable as evidenced by normal lochia, palpable uterine involution and vitals within normal limits  Outcome: Progressing     Problem: Infection - Postpartum  Goal: Postpartum patient will be free of signs and symptoms of infection  Outcome: Progressing     Problem: Respiratory/Oxygenation Function Post-Surgical  Goal: Patient will achieve/maintain normal respiratory rate/effort  Outcome: Progressing     Problem: Bowel Elimination - Post Surgical  Goal: Patient will resume regular bowel sounds and function with no discomfort or distention  Outcome: Progressing     Problem: Early Mobilization - Post Surgery  Goal: Early mobilization post surgery  Outcome: Progressing

## 2024-12-06 NOTE — L&D DELIVERY NOTE
OB  Delivery Note    2024  Quoc Horan  29 y.o.    Review the Delivery Report for details.     Gestational Age: 32w5d  /Para:   Labor Complications: Fetal Intolerance  Estimated Blood Loss: 700cc      Delivery Type: , Low Transverse  ROM to Delivery Time: rupture date, rupture time, delivery date, or delivery time have not been documented  Basile Sex: Male  Basile Weight:     1 Minute 5 Minute 10 Minute   Apgar Totals:               Details    Pre-Op Diagnosis: 1. Intrauterine pregnancy at 32w5d  2. cHTN with superimposed Pre-eclampsia with severe features  3. Failed induction of labor  4. NRFS remote from delivery   Delivery Justification Patient was admitted to Labor and Delivery at 32w5d for  cHTN with siPreE with SF   Post-Op Diagnosis: 1-4. Same  5. Viable male    Indications:  Fetal Intolerance of labor;Failure to Progress   Procedure: Primary , Low Transverse via Low Transverse incision   Staff Surgeon(s):  Pita Adhikari M.D.  Circulator: Allyson Mendez R.N.  First Assist: MONI Vega  Count Stacy: Cindi De Paz R.N.   Anesthesia: Epidural   Findings: Male infant delivered, weighing  . Normal uterus, tubes, and ovaries.     Informed Consent:  The risks, benefits, complications, and alternatives were discussed with the patient. The patient understood that the risks of  section include, but are not limited to: injury to nearby structures or organs, infection, blood loss and possible need for transfusion, and potential need for more surgery including hysterectomy. The patient stated understanding and desired to proceed. All questions were answered. The site of surgery was properly noted and marked. The patient was identified as Quoc Horan and the procedure verified as a  delivery. A Time Out was held and the above information confirmed.    Indication for Procedure: Failed IOL, NRFS remote  from delivery     Procedure in Detail:    The patient was taken to the operating room where epidural anesthesia was bolused and found to be adequate. A Cook catheter had already been inserted with sterile technique. She was given 2g Ancef preoperatively for prophylaxis. She was placed in the supine position with a leftward tilt and SCD stockings were placed and a time-out was performed. She was then prepped and draped in the normal sterile fashion.      A low transverse Pfannenstiel skin incision was made using a scalpel approximately 2 cm above the symphysis pubis. This was carried down sharply to the underlying layer of fascia with the knife. The fascia was nicked with the knife and superior and inferior portions  with blunt dissection. The rectus muscles were then  bluntly. The parietal peritoneum was entered bluntly and the peritoneal incision was extended by sharp dissection and blunt stretching. The Ra retractor was inserted.     The lower uterine segment was incised in a transverse fashion with the scalpel. The uterine incision was extended bluntly. The infant's head delivered atraumatically followed by the remainder of the body with fundal pressure.   The cord clamped and cut after delay of 30s and the infant was handed to awaiting Pediatric Staff. The placenta was then removed manually, the uterus cleared of all clots and debris. The uterine incision was repaired with 0 Vicryl in a running locked fashion. A second imbricating layer was placed in a vertical fashion. Continued significant oozing noted over the hysterotomy due to multiple active sinuses; a third imbricating layer was placed over the hysterotomy and excellent hemostasis visualized. Surgicel powder applied liberally over uterine incision.    The paracolic gutters were cleared of all clots. The Ra retractor was removed. A final look at the uterine incision revealed excellent hemostasis. The fascia was reapproximated  with 0 Vicryl in a running fashion starting on one end and going to be other. Irrigation of the subcutaneous tissue was performed. The subcutaneous fat was closed with 3-0 Vicryl. The skin was closed with a subcuticular stitch and steristrips and a pressure dressing was applied. The patient tolerated the procedure well. Sponge, lap and needle counts were correct x2 and the patient was taken to the recovery room in stable condition.        Pita Adhikari MD MPH

## 2024-12-06 NOTE — ANESTHESIA POSTPROCEDURE EVALUATION
Patient: Quoc Horan    Procedure Summary       Date: 24 Room / Location: SURGERY LABOR AND DELIVERY    Anesthesia Start: 1553 Anesthesia Stop: 24 0838    Procedure:  SECTION, PRIMARY (Abdomen) Diagnosis: (Fetal intolerance)    Surgeons: Melinda Mccarthy M.D. Responsible Provider: Galdino Wray M.D.    Anesthesia Type: epidural ASA Status: 2            Final Anesthesia Type: epidural  Last vitals  BP   Blood Pressure: (!) 157/73    Temp   37.2 °C (98.9 °F)    Pulse   91   Resp   16    SpO2   95 %      Anesthesia Post Evaluation    Patient location during evaluation: PACU  Patient participation: complete - patient participated  Level of consciousness: awake and alert  Pain score: 0    Airway patency: patent  Anesthetic complications: no  Cardiovascular status: hemodynamically stable  Respiratory status: acceptable  Hydration status: euvolemic    PONV: none          No notable events documented.     Nurse Pain Score: 0 (NPRS)

## 2024-12-06 NOTE — ANESTHESIA PROCEDURE NOTES
Epidural Block    Date/Time: 12/5/2024 3:55 PM    Performed by: Renay Maya D.O.  Authorized by: Renay Maya D.O.    Patient Location:  OB  Start Time:  12/5/2024 3:55 PM  End Time:  12/5/2024 4:00 PM  Reason for Block: labor analgesia    patient identified, IV checked, site marked, risks and benefits discussed, surgical consent, monitors and equipment checked and pre-op evaluation    Patient Position:  Sitting  Prep: ChloraPrep, patient draped and sterile technique    Monitoring:  Blood pressure, continuous pulse oximetry and heart rate  Approach:  Midline  Location:  L4-L5  Injection Technique:  ELIJAH saline  Skin infiltration:  Lidocaine  Strength:  1%  Dose:  3ml  Needle Type:  Tuohy  Needle Gauge:  17 G  Needle Length:  3.5 in  Loss of resistance::  7  Catheter Size:  19 G  Catheter at Skin Depth:  12  Test Dose Result:  Negative

## 2024-12-06 NOTE — PROGRESS NOTES
Took over care of patient from Dr. Mccarthy who is occupied with other delivery. Plan for  section for NRFS remote from delivery in setting of pre-eclampsia with severe features at 32w5d gestation. Patient agreeable to proceed.    The risks, benefits, complications, and alternatives were discussed with the patient. The patient understood that the risks of  section include, but are not limited to: injury to nearby structures or organs, infection, blood loss and possible need for transfusion, and potential need for more surgery including hysterectomy. The patient stated understanding and desired to proceed. All questions were answered. The site of surgery was properly noted and marked.     Pita Adhikari MD MPH

## 2024-12-06 NOTE — PROGRESS NOTES
0700- Report received from KRYSTIN Sharif.    0718- Pt agreed to .    1144- Dr Richardson updated on patient's O2 and pt being placed on a NC.     1155- This RN called Dr Presley for her to come assess the pt at bedside.  Per MD titrate O2 to maintain sats above 94.      1245- Dr Presley updated on pt's O2 needs, see flowsheets.  Per Dr Presley pt is cleared to transfer up to postpartum.      1355- Pt transferred up to postpartum.  Report given to KRYSTIN Jennings, fundus firm, care relinquished.

## 2024-12-06 NOTE — PROGRESS NOTES
"Called by RN for mild hypoxia. I went to evaluate her at bedside.    Patient with severe pain, does not feel like she can take a deep breath due to abdominal pain when she does this.  Arise denies any shortness of breath, chest pain, lightheadedness, confusion.    BP (!) 154/72   Pulse 90   Temp 37 °C (98.6 °F) (Temporal)   Resp 18   Ht 1.549 m (5' 1\")   Wt 75.8 kg (167 lb)   LMP 04/21/2024   SpO2 94%   Breastfeeding Unknown   BMI 31.55 kg/m²     Satting 92% in 0.5 L NC when I walked in   General: Well-appearing no acute distress  Respiratory: Work of breathing on room air, clear to auscultation bilaterally without rhonchi, wheezing, decreased breath sounds.  Cardiac: Regular rate and rhythm, no murmurs, rubs, or gallops.  Normal S1 and S2.     A/P:     Mild hypoxia in the immediate postoperative.  Signs or symptoms of acute or dangerous process.    Most likely 2/2 poor inspiration due to poorly controlled pain in setting on Ibuprofen allergy. Plan small dose IV dilaudid for improved pain control and IS.  If no improvement with better pain control plan chest x-ray.  Signs or symptoms of fluid overload or mag toxicity, so will continue with magnesium for eclampsia prophylaxis.    Continue supplemental oxygen to maintain sats at about 94% or greater.    Obdulia Presley MD  Obstetrics and Gynecology        "

## 2024-12-06 NOTE — ANESTHESIA TIME REPORT
Anesthesia Start and Stop Event Times       Date Time Event    12/5/2024 1553 Anesthesia Start     1555 Ready for Procedure    12/6/2024 0838 Anesthesia Stop          Responsible Staff  12/05/24 to 12/06/24      Name Role Begin End    Renay Maya D.O. Anesth 1553 0700    Galdino Wray M.D. Anesth 0700 0838          Overtime Reason:  no overtime (within assigned shift)    Comments:

## 2024-12-06 NOTE — PROGRESS NOTES
1900- Full report rec'd from Allyson RN, pt care assumed at this time. VS taken, pt assessment completed, and pt educated of labor progression and expectations. Pt verbalizes understanding with no further questions or concerns at this time.     1930- Dorothy ABEL at  to discuss POC, CNM states she plans to recheck pt SVE at 0600 once the cervidil comes out and further evaluate POC at that time. Pt verbalizes understanding and agrees to POC with no further questions or concerns at this time.     2130- Orders rec'd from Dr. Mccarthy to d/c Magnesium sulfate infusion, start labetalol 200mg BID (first dose starting now), monitor BP's overnight Q1h, keep cervidil in and reassess pt SVE and POC in the morning once cervidil is due out. Pt verbalizes understanding and agrees to POC with no further questions or concerns at this time.     2145- Magnesium Sulfate discontinued at this time.     0450- Call made to Dr. Mccarthy asking MD to review pt strip, MD informed fetal tracing has been in and out of reactivity and has reoccurring lates despite interventions. MD orders for cervidil to pulled out early, perform an SVE and update MD of cervical exam.     0509- Dr. Mccarthy updated of pt SVE unchanged. MD reviews strip and states to allow baby to recover from the removal of cervidil, and observe if ctx space out.    0520- Orders rec'd from Dr. Mccarthy to give the pt terbutaline to space out ctx at this time in attempt to allow baby to recover better.    0634- Primary c/s called at this time by Dr. Mccarthy and Dr. Amos.    0639- Dr. Mccarthy at  discussing risks and recommendation for a primary c/s. Pt verbalizes understanding with no further questions or concerns at this time. MD out.    0643- Pt and FOB expresses the need for more time to decide if they agree with there performance of a primary c/s. Pt and FOB requests for time alone to discuss their decision and state they'll call out when they have made a decision to do the c/s or  not.     0645- Dr. Mccarthy informed pt requesting for more time to decide if they'll like to perform the c/s or not.    0700- Full report given to Allyson MCCLELLAND. Pt care relinquished at this time.

## 2024-12-06 NOTE — CARE PLAN
The patient is Stable - Low risk of patient condition declining or worsening    Shift Goals  Clinical Goals: Healthy mom, healthy baby  Patient Goals: Deliver a healthy baby  Family Goals: Support    Progress made toward(s) clinical / shift goals:    Problem: Knowledge Deficit - L&D  Goal: Patient and family/caregivers will demonstrate understanding of plan of care, disease process/condition, diagnostic tests and medications  Description: Target End Date:  1-3 days or as soon as patient condition allows    1.  Oriented to unit, equipment, visitation policy and means for communicating concern  2.  Complete/review Learning Assessment  3.  Assess patient's preparation, knowledge level and expectations  4.  Provide accurate information in basic terms, clarify misconceptions  5.  Explain disease process/condition, treatment plan, diagnostic tests and medications  6.  Encourage patient and support person to ask questions and verbalize their understanding  7.  Instruct patient/support person in basic interpretation of fetal monitor  8.  Obtain informed consent when appropriate  9.  Demonstrate breathing/relaxation techniques  Outcome: Progressing     Problem: Pain  Goal: Patient's pain will be alleviated or reduced to the patient’s comfort goal  Description: Target End Date:  Prior to discharge or change in level of care    1.  Document pain using the appropriate pain scale per order or unit policy  2.  Administer pain medications per provider order and/or assist with epidural/spinal placement as needed  3.  Pain management medications as ordered  4.  Educate and implement non-pharmacologic comfort measures (i.e. relaxation, distraction, massage, cold/heat therapy, etc.)  5.  Assess for nonverbal signs of ineffective coping with pain and offer pain medication and/or epidural anesthesia  Note: Pt continuously monitored for pain, educated on pain management options.  Call light within reach, pt understands to call RN  regarding any concerns       Patient is not progressing towards the following goals:

## 2024-12-06 NOTE — PROGRESS NOTES
1420 Assume care from L&D RN Allyson. Oriented patient to room,call light, and emergancy light. Assessment completed,fundus firm,lochia light. Plan of care reviewed, verbilized understanding. Patient denies pain at this time, will call if intervention needed. Patient is undecided if she want infant to received DBM or exclusively formula feed. Reviewed pumping requirement for DBM, that she would need to pump Q3hrs around the clock, and if she wanted that, that this RN could set her up to pump now. Patient stated she would think about it.

## 2024-12-06 NOTE — CARE PLAN
Problem: Pain  Goal: Patient's pain will be alleviated or reduced to the patient’s comfort goal  Outcome: Progressing  Flowsheets  Taken 12/5/2024 1903 by Chante Shultz R.N.  OB Pain Level: 1-Coping  OB Pain Intervention: Epidural  Taken 12/4/2024 0940 by Jill Velasquez R.N.  Pain Rating Scale (NPRS): 0  Note: Document pain using the appropriate pain scale per order or unit policy. Administer pain medications per provider order and/or assist with epidural/spinal placement as need. Pain management medications as ordered. Educate and implement non-pharmacologic comfort measures (i.e. relaxation, distraction, massage, cold/heat therapy, etc.). Assess for nonverbal signs of ineffective coping with pain      Problem: Psychosocial - L&D  Goal: Patient's level of anxiety will decrease  Outcome: Progressing  Note: Collaborate with patient and family/caregiver to identify triggers and develop strategies to cope with anxiety. Implement stimuli reduction, calming techniques. Pharmacologic management per provider order. Encourage patient/family/care giver participation   Goal: Patient will be able to discuss coping skills during hospitalization  Outcome: Progressing  Note: Assess patient and support person's emotional response. Review patient and support person's participation and role in birth experience. Identify positive behaviors during the process. Encourage presence/participation of support person      Problem: Risk for Venous Thromboembolism (VTE)  Goal: VTE prevention measures will be implemented and patient will remain free from VTE  Outcome: Progressing  Note: Intermittent sequential compression device in place 23 hours per day or per provider order.  Pharmacologic prophylaxis per provider order. Monitor for anticoagulation complications.  Educate patient and family/caregiver about the importance of intermittent sequential and early ambulation when able. Educate/demonstrate to patient and family/caregiver about  ankle flex, foot rotation and knee flex exercises in addition to other prophylactic measures every hour while awake       The patient is Stable - Low risk of patient condition declining or worsening    Shift Goals  Clinical Goals: Cervical dilation and labor progression with healthy mom and baby.  Patient Goals: Healthy mom & baby.  Family Goals: Support.    Progress made toward(s) clinical / shift goals:  Progressing

## 2024-12-07 ENCOUNTER — APPOINTMENT (OUTPATIENT)
Dept: RADIOLOGY | Facility: MEDICAL CENTER | Age: 29
End: 2024-12-07
Payer: OTHER GOVERNMENT

## 2024-12-07 LAB
BASOPHILS # BLD AUTO: 0.2 % (ref 0–1.8)
BASOPHILS # BLD: 0.03 K/UL (ref 0–0.12)
EOSINOPHIL # BLD AUTO: 0 K/UL (ref 0–0.51)
EOSINOPHIL NFR BLD: 0 % (ref 0–6.9)
ERYTHROCYTE [DISTWIDTH] IN BLOOD BY AUTOMATED COUNT: 41.1 FL (ref 35.9–50)
HCT VFR BLD AUTO: 30.4 % (ref 37–47)
HGB BLD-MCNC: 10.1 G/DL (ref 12–16)
IMM GRANULOCYTES # BLD AUTO: 0.23 K/UL (ref 0–0.11)
IMM GRANULOCYTES NFR BLD AUTO: 1.3 % (ref 0–0.9)
LYMPHOCYTES # BLD AUTO: 1.54 K/UL (ref 1–4.8)
LYMPHOCYTES NFR BLD: 8.6 % (ref 22–41)
MAGNESIUM SERPL-MCNC: 3.2 MG/DL (ref 1.5–2.5)
MCH RBC QN AUTO: 29.7 PG (ref 27–33)
MCHC RBC AUTO-ENTMCNC: 33.2 G/DL (ref 32.2–35.5)
MCV RBC AUTO: 89.4 FL (ref 81.4–97.8)
MONOCYTES # BLD AUTO: 1.59 K/UL (ref 0–0.85)
MONOCYTES NFR BLD AUTO: 8.9 % (ref 0–13.4)
NEUTROPHILS # BLD AUTO: 14.49 K/UL (ref 1.82–7.42)
NEUTROPHILS NFR BLD: 81 % (ref 44–72)
NRBC # BLD AUTO: 0 K/UL
NRBC BLD-RTO: 0 /100 WBC (ref 0–0.2)
PLATELET # BLD AUTO: 236 K/UL (ref 164–446)
PMV BLD AUTO: 10.2 FL (ref 9–12.9)
RBC # BLD AUTO: 3.4 M/UL (ref 4.2–5.4)
WBC # BLD AUTO: 17.9 K/UL (ref 4.8–10.8)

## 2024-12-07 PROCEDURE — A9270 NON-COVERED ITEM OR SERVICE: HCPCS | Performed by: OBSTETRICS & GYNECOLOGY

## 2024-12-07 PROCEDURE — 36415 COLL VENOUS BLD VENIPUNCTURE: CPT

## 2024-12-07 PROCEDURE — A9270 NON-COVERED ITEM OR SERVICE: HCPCS | Performed by: STUDENT IN AN ORGANIZED HEALTH CARE EDUCATION/TRAINING PROGRAM

## 2024-12-07 PROCEDURE — 700102 HCHG RX REV CODE 250 W/ 637 OVERRIDE(OP): Performed by: NURSE PRACTITIONER

## 2024-12-07 PROCEDURE — 83735 ASSAY OF MAGNESIUM: CPT

## 2024-12-07 PROCEDURE — 700102 HCHG RX REV CODE 250 W/ 637 OVERRIDE(OP): Performed by: ANESTHESIOLOGY

## 2024-12-07 PROCEDURE — 700105 HCHG RX REV CODE 258: Performed by: FAMILY MEDICINE

## 2024-12-07 PROCEDURE — 71046 X-RAY EXAM CHEST 2 VIEWS: CPT

## 2024-12-07 PROCEDURE — A9270 NON-COVERED ITEM OR SERVICE: HCPCS | Performed by: NURSE PRACTITIONER

## 2024-12-07 PROCEDURE — 700102 HCHG RX REV CODE 250 W/ 637 OVERRIDE(OP): Performed by: OBSTETRICS & GYNECOLOGY

## 2024-12-07 PROCEDURE — 700111 HCHG RX REV CODE 636 W/ 250 OVERRIDE (IP): Mod: JZ

## 2024-12-07 PROCEDURE — 770002 HCHG ROOM/CARE - OB PRIVATE (112)

## 2024-12-07 PROCEDURE — 85025 COMPLETE CBC W/AUTO DIFF WBC: CPT

## 2024-12-07 PROCEDURE — 700102 HCHG RX REV CODE 250 W/ 637 OVERRIDE(OP): Performed by: STUDENT IN AN ORGANIZED HEALTH CARE EDUCATION/TRAINING PROGRAM

## 2024-12-07 PROCEDURE — A9270 NON-COVERED ITEM OR SERVICE: HCPCS | Performed by: ANESTHESIOLOGY

## 2024-12-07 RX ORDER — FUROSEMIDE 10 MG/ML
10 INJECTION INTRAMUSCULAR; INTRAVENOUS ONCE
Status: COMPLETED | OUTPATIENT
Start: 2024-12-07 | End: 2024-12-07

## 2024-12-07 RX ADMIN — SODIUM CHLORIDE, POTASSIUM CHLORIDE, SODIUM LACTATE AND CALCIUM CHLORIDE: 600; 310; 30; 20 INJECTION, SOLUTION INTRAVENOUS at 06:14

## 2024-12-07 RX ADMIN — LABETALOL HYDROCHLORIDE 200 MG: 100 TABLET, FILM COATED ORAL at 18:17

## 2024-12-07 RX ADMIN — POLYETHYLENE GLYCOL 3350 1 PACKET: 17 POWDER, FOR SOLUTION ORAL at 06:05

## 2024-12-07 RX ADMIN — FUROSEMIDE 10 MG: 10 INJECTION, SOLUTION INTRAVENOUS at 10:18

## 2024-12-07 RX ADMIN — ACETAMINOPHEN 1000 MG: 500 TABLET, FILM COATED ORAL at 14:26

## 2024-12-07 RX ADMIN — OXYCODONE 10 MG: 5 TABLET ORAL at 16:21

## 2024-12-07 RX ADMIN — OXYCODONE 10 MG: 5 TABLET ORAL at 21:28

## 2024-12-07 RX ADMIN — ANTACID TABLETS 500 MG: 500 TABLET, CHEWABLE ORAL at 06:04

## 2024-12-07 RX ADMIN — LABETALOL HYDROCHLORIDE 200 MG: 100 TABLET, FILM COATED ORAL at 06:05

## 2024-12-07 RX ADMIN — ACETAMINOPHEN 1000 MG: 500 TABLET ORAL at 06:04

## 2024-12-07 ASSESSMENT — PAIN DESCRIPTION - PAIN TYPE
TYPE: ACUTE PAIN
TYPE: ACUTE PAIN;SURGICAL PAIN
TYPE: ACUTE PAIN;SURGICAL PAIN
TYPE: ACUTE PAIN
TYPE: ACUTE PAIN;SURGICAL PAIN

## 2024-12-07 NOTE — LACTATION NOTE
Pumping initiated:    Met with Quoc to assist in initiation of pumping following the 32 week delivery of baby Luis. Quoc was originally planning to formula feed, but would like to pump to provide breast milk, due to Luis's  status.     Lactation risk factors:  delivery, Pre-eclampsia.   Quoc reports positive breast changes in pregnancy.    Settings and pump use reviewed and demonstrated. 22.5 mm flanges fit appropriately and patient reports comfort @ 35% suction. Pump at speed of 80cpm for 2 min and then reduce to 60cpm for a total of 15min every 2-3hrs. Follow with 2-3 minutes hand expression. Anticipatory guidance provided regarding typical volumes of colostrum expressed in the first 1-3 days. Reviewed milk collection and labelling process for NICU administration.    Handouts provided: How to Maximize Milk Production, Pump Parts Washing Guide, Hospital Grade Pump Rental Information, Hand Expression, and Milk Storage Guidelines.     Quoc is encouraged to call RN/LC with any developing lactation related questions or concerns.

## 2024-12-07 NOTE — PROGRESS NOTES
Received report from Layla MCCLELLAND. Educated pt on today's POC. All questions answered at this time. Call light within reach.    3016 Notified Dr. Payan of x ray results. No new orders at this time.

## 2024-12-07 NOTE — PROGRESS NOTES
Obstetrics & Gynecology Post-Delivery Progress Note    Date of Service  24    29 y.o.  s/p  for fetal distress after IOL for preeclampsia with severe features.  Delivery date: 2024    Patient started IOL for preeclampsia with severe features on  24.  She underwent multiple cervical ripening attempts with Cytotec and Cervidil.  She was started on magnesium, which was discontinued at [facility] recommendation after blood pressures normalized during labor and patient had no signs or symptoms of preeclampsia at that time.  Patient did develop decreasing variability and late decelerations associated with tachysystole and had toco lysis with terbutaline.   delivery was recommended at that time she underwent  delivery for fetal intolerance of labor without complications.     Subjective  Pt reports she is overall feeling well.  She still has Tarango catheter in place and has not yet ambulated or had a bowel movement.  Her nausea has subsided from magnesium treatment as it has been completed and her appetite is improving.  She denies any dyspnea.    Pain: Yes,  controlled  Bleeding: lochia less than regular menstrual bleeding  Tolerating PO: Yes  Voiding: tarango in place  Ambulating: no  Passing flatus: Yes  Feeding:  planning to pump breast milk for baby in NICU    Objective  24hr VS:  Temp:  [36.2 °C (97.1 °F)-37 °C (98.6 °F)] 36.7 °C (98.1 °F)  Pulse:  [68-96] 69  Resp:  [14-20] 18  BP: (126-177)/() 152/88  SpO2:  [89 %-96 %] 90 %    Physical Exam  Gen: well and resting  CV: RRR, nl S1 and S2, no murmur  Resp: unlabored respirations, no intercostal retractions or accessory muscle use, clear to auscultation without rales or wheezes, decreased breath sounds bilateral bases  Chest/Breasts: exam deferred  Abd: nontender, soft  Fundus: firm, below umbilicus, and nontender  Incision: dressing clean, dry, intact  Perineum: deferred  Ext: symmetric and 1+ edema, calves  nontender    Labs:  Recent Results (from the past 48 hours)   CBC WITHOUT DIFFERENTIAL    Collection Time: 12/05/24 11:22 AM   Result Value Ref Range    WBC 25.5 (H) 4.8 - 10.8 K/uL    RBC 4.35 4.20 - 5.40 M/uL    Hemoglobin 12.9 12.0 - 16.0 g/dL    Hematocrit 37.9 37.0 - 47.0 %    MCV 87.1 81.4 - 97.8 fL    MCH 29.7 27.0 - 33.0 pg    MCHC 34.0 32.2 - 35.5 g/dL    RDW 39.8 35.9 - 50.0 fL    Platelet Count 304 164 - 446 K/uL    MPV 10.3 9.0 - 12.9 fL   Comp Metabolic Panel    Collection Time: 12/05/24 11:22 AM   Result Value Ref Range    Sodium 131 (L) 135 - 145 mmol/L    Potassium 3.8 3.6 - 5.5 mmol/L    Chloride 100 96 - 112 mmol/L    Co2 17 (L) 20 - 33 mmol/L    Anion Gap 14.0 7.0 - 16.0    Glucose 112 (H) 65 - 99 mg/dL    Bun 9 8 - 22 mg/dL    Creatinine 0.62 0.50 - 1.40 mg/dL    Calcium 8.0 (L) 8.5 - 10.5 mg/dL    Correct Calcium 8.4 (L) 8.5 - 10.5 mg/dL    AST(SGOT) 26 12 - 45 U/L    ALT(SGPT) 17 2 - 50 U/L    Alkaline Phosphatase 177 (H) 30 - 99 U/L    Total Bilirubin 0.3 0.1 - 1.5 mg/dL    Albumin 3.5 3.2 - 4.9 g/dL    Total Protein 7.0 6.0 - 8.2 g/dL    Globulin 3.5 1.9 - 3.5 g/dL    A-G Ratio 1.0 g/dL   MAGNESIUM    Collection Time: 12/05/24 11:22 AM   Result Value Ref Range    Magnesium 5.9 (H) 1.5 - 2.5 mg/dL   ESTIMATED GFR    Collection Time: 12/05/24 11:22 AM   Result Value Ref Range    GFR (CKD-EPI) 123 >60 mL/min/1.73 m 2   SERUM MAGNESIUM LEVELS EVERY 6 HRS UNTIL DESIRED RANGE (5-8 MG/DL) ACHIEVED    Collection Time: 12/05/24  5:27 PM   Result Value Ref Range    Magnesium 6.0 (H) 1.5 - 2.5 mg/dL   Histology Request    Collection Time: 12/06/24  7:54 AM   Result Value Ref Range    Pathology Request Sent to Hist[facility]    MAGNESIUM    Collection Time: 12/06/24 10:48 AM   Result Value Ref Range    Magnesium 3.6 (H) 1.5 - 2.5 mg/dL   Serum magnesium levels every 6 hours until desired range( 5-8mg/dl) is achieved while infusion is running    Collection Time: 12/06/24  5:00 PM   Result Value Ref Range     Magnesium 3.2 (H) 1.5 - 2.5 mg/dL   Serum magnesium levels every 6 hours until desired range( 5-8mg/dl) is achieved while infusion is running    Collection Time: 12/06/24 11:01 PM   Result Value Ref Range    Magnesium 3.2 (H) 1.5 - 2.5 mg/dL   Serum magnesium levels every 6 hours until desired range( 5-8mg/dl) is achieved while infusion is running    Collection Time: 12/07/24  5:16 AM   Result Value Ref Range    Magnesium 3.2 (H) 1.5 - 2.5 mg/dL   CBC WITH DIFFERENTIAL    Collection Time: 12/07/24  5:16 AM   Result Value Ref Range    WBC 17.9 (H) 4.8 - 10.8 K/uL    RBC 3.40 (L) 4.20 - 5.40 M/uL    Hemoglobin 10.1 (L) 12.0 - 16.0 g/dL    Hematocrit 30.4 (L) 37.0 - 47.0 %    MCV 89.4 81.4 - 97.8 fL    MCH 29.7 27.0 - 33.0 pg    MCHC 33.2 32.2 - 35.5 g/dL    RDW 41.1 35.9 - 50.0 fL    Platelet Count 236 164 - 446 K/uL    MPV 10.2 9.0 - 12.9 fL    Neutrophils-Polys 81.00 (H) 44.00 - 72.00 %    Lymphocytes 8.60 (L) 22.00 - 41.00 %    Monocytes 8.90 0.00 - 13.40 %    Eosinophils 0.00 0.00 - 6.90 %    Basophils 0.20 0.00 - 1.80 %    Immature Granulocytes 1.30 (H) 0.00 - 0.90 %    Nucleated RBC 0.00 0.00 - 0.20 /100 WBC    Neutrophils (Absolute) 14.49 (H) 1.82 - 7.42 K/uL    Lymphs (Absolute) 1.54 1.00 - 4.80 K/uL    Monos (Absolute) 1.59 (H) 0.00 - 0.85 K/uL    Eos (Absolute) 0.00 0.00 - 0.51 K/uL    Baso (Absolute) 0.03 0.00 - 0.12 K/uL    Immature Granulocytes (abs) 0.23 (H) 0.00 - 0.11 K/uL    NRBC (Absolute) 0.00 K/uL       Medications  furosemide, 10 mg, Intravenous, Once  acetaminophen, 1,000 mg, Oral, Q6HR  acetaminophen, 1,000 mg, Oral, Q6HRS  polyethylene glycol/lytes, 1 Packet, Oral, DAILY  labetalol, 200 mg, Oral, TWICE DAILY  calcium carbonate, 500 mg, Oral, DAILY      PRN medications: LR, acetaminophen **FOLLOWED BY** [START ON 12/10/2024] acetaminophen, oxyCODONE immediate-release, oxyCODONE immediate release, ondansetron **OR** ondansetron, diphenhydrAMINE **OR** diphenhydrAMINE, docusate sodium, calcium  GLUConate      Assessment/Plan  Quoc Horan is a 29 y.o.yo  postpartum day #1  s/p  for fetal distress delivery    - Post care:  Pending discontinuation of Cook, ambulation  - Patient had oxygen requirement up to 2 L postpartum.  Chest x-ray was obtained demonstrating mild pulmonary edema.  IV Lasix 10 mg was administered with improvement and patient was weaned to room air.  - Continue monitoring blood pressure.  Patient currently on labetalol 200 mg twice daily  - Pain: controlled   - Rh+, Rubella Immune  - Method of Feeding: plans to breastfeed  - Method of Contraception:  not discussed today    VTE prophylaxis:  SCDs, patient to begin ambulating today    - Disposition: Anticipate discharge home on PPD 3-4     Cara Payan D.O.   PGY-1  [facility]

## 2024-12-07 NOTE — PROGRESS NOTES
2000-Assessment completed. Fundus firm, lochia light. Abd. incisions with pressure dressing intact. Plan of care reviewed. Denies pain at this time, will call if pain med intervention needed. Call light within reach, bed at lowest position, and shows no signs of distress at this time.    2015- Went over pumping instructions with patient, but she would like to rest for now and she will call when she is ready to pump.    0014- Notified Jared DAVEY (Midwife) regarding patient on 1L of O2. No new orders at this time.    0449- No orders for CBC. Reached out to Jared DAVEY (Midwife) and got orders to place CBC.    0520- Patient to xray with this RN.    0540- Patient back in bed, resting comfortably, no signs of distress, and call light within reach.

## 2024-12-07 NOTE — CARE PLAN
The patient is Stable - Low risk of patient condition declining or worsening    Shift Goals  Clinical Goals: Pain control, Vitals signs WDL, Lochia WDL  Patient Goals:  Family Goals:     Progress made toward(s) clinical / shift goals:    Problem: Respiratory/Oxygenation Function Post-Surgical  Goal: Patient will achieve/maintain normal respiratory rate/effort  Outcome: Progressing  Note: Patient on 1 L of oxygen while sleeping and maintains O2 WDL.     Problem: Altered Physiologic Condition  Goal: Patient physiologically stable as evidenced by normal lochia, palpable uterine involution and vitals within normal limits  Outcome: Progressing  Note: Patient's fundus firm, at umbilicus with scant bleeding through out this shift.

## 2024-12-07 NOTE — CARE PLAN
The patient is Stable - Low risk of patient condition declining or worsening    Shift Goals  Clinical Goals: VSS, light lochia  Patient Goals: pain control, rest  Family Goals: support    Progress made toward(s) clinical / shift goals:    Problem: Knowledge Deficit - Postpartum  Goal: Patient will verbalize and demonstrate understanding of self and infant care  Outcome: Progressing     Problem: Psychosocial - Postpartum  Goal: Patient will verbalize and demonstrate effective bonding and parenting behavior  Outcome: Progressing     Problem: Early Mobilization - Post Surgery  Goal: Early mobilization post surgery  Outcome: Progressing       Patient is not progressing towards the following goals:

## 2024-12-08 PROCEDURE — 700102 HCHG RX REV CODE 250 W/ 637 OVERRIDE(OP)

## 2024-12-08 PROCEDURE — A9270 NON-COVERED ITEM OR SERVICE: HCPCS | Performed by: OBSTETRICS & GYNECOLOGY

## 2024-12-08 PROCEDURE — 700102 HCHG RX REV CODE 250 W/ 637 OVERRIDE(OP): Performed by: OBSTETRICS & GYNECOLOGY

## 2024-12-08 PROCEDURE — 700102 HCHG RX REV CODE 250 W/ 637 OVERRIDE(OP): Performed by: STUDENT IN AN ORGANIZED HEALTH CARE EDUCATION/TRAINING PROGRAM

## 2024-12-08 PROCEDURE — A9270 NON-COVERED ITEM OR SERVICE: HCPCS | Performed by: NURSE PRACTITIONER

## 2024-12-08 PROCEDURE — A9270 NON-COVERED ITEM OR SERVICE: HCPCS

## 2024-12-08 PROCEDURE — A9270 NON-COVERED ITEM OR SERVICE: HCPCS | Performed by: STUDENT IN AN ORGANIZED HEALTH CARE EDUCATION/TRAINING PROGRAM

## 2024-12-08 PROCEDURE — 700102 HCHG RX REV CODE 250 W/ 637 OVERRIDE(OP): Performed by: NURSE PRACTITIONER

## 2024-12-08 PROCEDURE — 770002 HCHG ROOM/CARE - OB PRIVATE (112)

## 2024-12-08 RX ORDER — LABETALOL 300 MG/1
300 TABLET, FILM COATED ORAL EVERY 8 HOURS
Status: DISCONTINUED | OUTPATIENT
Start: 2024-12-08 | End: 2024-12-09 | Stop reason: HOSPADM

## 2024-12-08 RX ORDER — NIFEDIPINE 30 MG/1
30 TABLET, EXTENDED RELEASE ORAL
Status: DISCONTINUED | OUTPATIENT
Start: 2024-12-08 | End: 2024-12-09 | Stop reason: HOSPADM

## 2024-12-08 RX ADMIN — ACETAMINOPHEN 1000 MG: 500 TABLET, FILM COATED ORAL at 10:00

## 2024-12-08 RX ADMIN — ACETAMINOPHEN 1000 MG: 500 TABLET, FILM COATED ORAL at 22:10

## 2024-12-08 RX ADMIN — LABETALOL HYDROCHLORIDE 300 MG: 300 TABLET, FILM COATED ORAL at 22:10

## 2024-12-08 RX ADMIN — ACETAMINOPHEN 1000 MG: 500 TABLET, FILM COATED ORAL at 03:47

## 2024-12-08 RX ADMIN — ACETAMINOPHEN 1000 MG: 500 TABLET, FILM COATED ORAL at 16:00

## 2024-12-08 RX ADMIN — OXYCODONE 5 MG: 5 TABLET ORAL at 03:46

## 2024-12-08 RX ADMIN — NIFEDIPINE 30 MG: 30 TABLET, FILM COATED, EXTENDED RELEASE ORAL at 12:05

## 2024-12-08 RX ADMIN — ANTACID TABLETS 500 MG: 500 TABLET, CHEWABLE ORAL at 06:07

## 2024-12-08 RX ADMIN — POLYETHYLENE GLYCOL 3350 1 PACKET: 17 POWDER, FOR SOLUTION ORAL at 06:07

## 2024-12-08 RX ADMIN — LABETALOL HYDROCHLORIDE 200 MG: 100 TABLET, FILM COATED ORAL at 06:07

## 2024-12-08 ASSESSMENT — PAIN DESCRIPTION - PAIN TYPE
TYPE: ACUTE PAIN;SURGICAL PAIN
TYPE: SURGICAL PAIN
TYPE: ACUTE PAIN
TYPE: ACUTE PAIN
TYPE: SURGICAL PAIN

## 2024-12-08 NOTE — LACTATION NOTE
Follow up lactation visit:    Met with Quoc to provide pumping support. She reports that she is pumping comfortably with 22.5mm flanges at 30% suction and is expressing drops of colostrum. Quoc verbalizes a desire to discharge home today. She plans to purchase a [employer] double electric breast pump today for use at home. She is aware of option to rent hospital-grade pump, if desired.    Pumping Plan:  Continue pumping breasts every three hours, followed by 2-3 minutes of hand expression.    Discussed access to lactation staff for entire duration of baby's NICU stay, and Quoc is encouraged to request re-consultation as needed.

## 2024-12-08 NOTE — PROGRESS NOTES
2000- Patient in  NICU visiting infant.    2130- Assessment completed. Fundus firm, lochia light. Abd. incisions open to air with steri strips in place. Plan of care reviewed. Denies pain at this time, will call if pain med intervention needed. Call light within reach, bed at lowest position, and shows no signs of distress at this time.

## 2024-12-08 NOTE — CARE PLAN
The patient is Stable - Low risk of patient condition declining or worsening    Shift Goals  Clinical Goals: Pain control, Vitals signs WDL, Lochia WDL  Patient Goals:   Family Goals:     Progress made toward(s) clinical / shift goals:    Problem: Early Mobilization - Post Surgery  Goal: Early mobilization post surgery  Outcome: Progressing  Note: Patient ambulates around room with a steady gait and tolerates well through out this shift.     Problem: Altered Physiologic Condition  Goal: Patient physiologically stable as evidenced by normal lochia, palpable uterine involution and vitals within normal limits  Outcome: Progressing  Note: Patient's fundus firm, one below umbilicus with scant bleeding through out this shift.

## 2024-12-08 NOTE — PROGRESS NOTES
1000: Assumed care of patient, assessment completed. Fundus is firm at two below umbilicus  with scant lochia rubra. Patient reports 3/10 pain at this time, scheduled medication given see MAR. Patient declines assistance with electric breast pump use/settings/cleaning. Plan of care discussed, patient verbalized understanding.    1053: MD Payan notified of patient request to discharge and most recent BP of 156/91. Per MD, patient not cleared for discharge. New orders at this time--Nifedipine SR 30 mg PO Q day, first dose starting now.     1550: Report given to Dorita MCCLELLAND.

## 2024-12-08 NOTE — CARE PLAN
The patient is Stable - Low risk of patient condition declining or worsening    Shift Goals  Clinical Goals: patient will remain clinically stable  Patient Goals:   Family Goals:     Progress made toward(s) clinical / shift goals:      Problem: Pain - Standard  Goal: Alleviation of pain or a reduction in pain to the patient’s comfort goal  Outcome: Progressing     Problem: Altered Physiologic Condition  Goal: Patient physiologically stable as evidenced by normal lochia, palpable uterine involution and vitals within normal limits  Outcome: Progressing     Patient is able to verbalize pain and request for pain management if needed. Patient is receiving scheduled medication for management. Fundus is firm at two below umbilicus with scant lochia rubra. Patient has had mildly elevated blood pressures, started on Nifedipine SR 30 mg PO Q day.     Patient is not progressing towards the following goals:

## 2024-12-08 NOTE — PROGRESS NOTES
Obstetrics & Gynecology Post-Delivery Progress Note    Date of Service  24    29 y.o.  s/p  for fetal distress after IOL for preeclampsia with severe features.  Delivery date: 2024    Patient started IOL for preeclampsia with severe features on  24.  She underwent multiple cervical ripening attempts with Cytotec and Cervidil.  She was started on magnesium, which was discontinued at [facility] recommendation after blood pressures normalized during labor and patient had no signs or symptoms of preeclampsia at that time.  Patient did develop decreasing variability and late decelerations associated with tachysystole and had toco lysis with terbutaline.   delivery was recommended at that time she underwent  delivery for fetal intolerance of labor without complications.     Subjective  Pt reports she wants to discharge today if possible. She is doing much better than yesterday. Baby remains in the NICU.    Pain: Yes,  controlled  Bleeding: lochia less than regular menstrual bleeding  Tolerating PO: Yes  Voiding: without difficulty  Ambulating: yes  Passing flatus: Yes  Feeding: breastfeeding pumping milk for baby in NICU    Objective  24hr VS:  Temp:  [36.6 °C (97.9 °F)-37.3 °C (99.1 °F)] 36.8 °C (98.3 °F)  Pulse:  [68-89] 81  Resp:  [17-18] 18  BP: (135-155)/(78-90) 142/78  SpO2:  [90 %-98 %] 92 %    Physical Exam  Gen: well and resting  CV: RRR, nl S1 and S2, no murmur  Resp: unlabored respirations, no intercostal retractions or accessory muscle use, clear to auscultation without rales or wheezes  Chest/Breasts: exam deferred  Abd: nontender, soft  Fundus: firm, below umbilicus, and nontender  Incision: dressing clean, dry, intact  Perineum: deferred  Ext: symmetric and 1+ edema, calves nontender    Labs:  Recent Results (from the past 48 hours)   Histology Request    Collection Time: 24  7:54 AM   Result Value Ref Range    Pathology Request Sent to [facility]     Collection Time: 12/06/24 10:48 AM   Result Value Ref Range    Magnesium 3.6 (H) 1.5 - 2.5 mg/dL   Serum magnesium levels every 6 hours until desired range( 5-8mg/dl) is achieved while infusion is running    Collection Time: 12/06/24  5:00 PM   Result Value Ref Range    Magnesium 3.2 (H) 1.5 - 2.5 mg/dL   Serum magnesium levels every 6 hours until desired range( 5-8mg/dl) is achieved while infusion is running    Collection Time: 12/06/24 11:01 PM   Result Value Ref Range    Magnesium 3.2 (H) 1.5 - 2.5 mg/dL   Serum magnesium levels every 6 hours until desired range( 5-8mg/dl) is achieved while infusion is running    Collection Time: 12/07/24  5:16 AM   Result Value Ref Range    Magnesium 3.2 (H) 1.5 - 2.5 mg/dL   CBC WITH DIFFERENTIAL    Collection Time: 12/07/24  5:16 AM   Result Value Ref Range    WBC 17.9 (H) 4.8 - 10.8 K/uL    RBC 3.40 (L) 4.20 - 5.40 M/uL    Hemoglobin 10.1 (L) 12.0 - 16.0 g/dL    Hematocrit 30.4 (L) 37.0 - 47.0 %    MCV 89.4 81.4 - 97.8 fL    MCH 29.7 27.0 - 33.0 pg    MCHC 33.2 32.2 - 35.5 g/dL    RDW 41.1 35.9 - 50.0 fL    Platelet Count 236 164 - 446 K/uL    MPV 10.2 9.0 - 12.9 fL    Neutrophils-Polys 81.00 (H) 44.00 - 72.00 %    Lymphocytes 8.60 (L) 22.00 - 41.00 %    Monocytes 8.90 0.00 - 13.40 %    Eosinophils 0.00 0.00 - 6.90 %    Basophils 0.20 0.00 - 1.80 %    Immature Granulocytes 1.30 (H) 0.00 - 0.90 %    Nucleated RBC 0.00 0.00 - 0.20 /100 WBC    Neutrophils (Absolute) 14.49 (H) 1.82 - 7.42 K/uL    Lymphs (Absolute) 1.54 1.00 - 4.80 K/uL    Monos (Absolute) 1.59 (H) 0.00 - 0.85 K/uL    Eos (Absolute) 0.00 0.00 - 0.51 K/uL    Baso (Absolute) 0.03 0.00 - 0.12 K/uL    Immature Granulocytes (abs) 0.23 (H) 0.00 - 0.11 K/uL    NRBC (Absolute) 0.00 K/uL       Medications  NIFEdipine SR, 30 mg, Oral, Q DAY  acetaminophen, 1,000 mg, Oral, Q6HRS  polyethylene glycol/lytes, 1 Packet, Oral, DAILY  labetalol, 200 mg, Oral, TWICE DAILY  calcium carbonate, 500 mg, Oral, DAILY      PRN  medications: LR, acetaminophen **FOLLOWED BY** [START ON 12/10/2024] acetaminophen, oxyCODONE immediate-release, oxyCODONE immediate release, ondansetron **OR** ondansetron, diphenhydrAMINE **OR** diphenhydrAMINE, docusate sodium, calcium GLUConate      Assessment/Plan  Quoc Horan is a 29 y.o.yo  postpartum day #2  s/p  for fetal distress delivery    - Post care: meeting all goals  - On room air since 12/7 am  - BP uncontrolled, pressures 150s/90s today. Currently on labetalol 200 mg twice daily.  - Nifedipine XR 30 mg added   - Pain: controlled   - Rh+, Rubella Immune  - Method of Feeding: plans to breastfeed  - Method of Contraception: undecided    VTE prophylaxis: patient ambulating    - Disposition: Anticipate discharge home on PPD 3-4 if BP stable.    Cara Payan D.O.   PGY-1  [facility]

## 2024-12-09 ENCOUNTER — PHARMACY VISIT (OUTPATIENT)
Dept: PHARMACY | Facility: MEDICAL CENTER | Age: 29
End: 2024-12-09
Payer: COMMERCIAL

## 2024-12-09 ENCOUNTER — TELEPHONE (OUTPATIENT)
Dept: OBGYN | Facility: CLINIC | Age: 29
End: 2024-12-09
Payer: OTHER GOVERNMENT

## 2024-12-09 VITALS
HEIGHT: 61 IN | TEMPERATURE: 98.6 F | RESPIRATION RATE: 18 BRPM | HEART RATE: 79 BPM | DIASTOLIC BLOOD PRESSURE: 91 MMHG | WEIGHT: 167 LBS | SYSTOLIC BLOOD PRESSURE: 144 MMHG | OXYGEN SATURATION: 95 % | BODY MASS INDEX: 31.53 KG/M2

## 2024-12-09 PROCEDURE — 700102 HCHG RX REV CODE 250 W/ 637 OVERRIDE(OP): Performed by: STUDENT IN AN ORGANIZED HEALTH CARE EDUCATION/TRAINING PROGRAM

## 2024-12-09 PROCEDURE — A9270 NON-COVERED ITEM OR SERVICE: HCPCS | Performed by: NURSE PRACTITIONER

## 2024-12-09 PROCEDURE — 99999 PR NO CHARGE: CPT | Performed by: STUDENT IN AN ORGANIZED HEALTH CARE EDUCATION/TRAINING PROGRAM

## 2024-12-09 PROCEDURE — A9270 NON-COVERED ITEM OR SERVICE: HCPCS

## 2024-12-09 PROCEDURE — 700102 HCHG RX REV CODE 250 W/ 637 OVERRIDE(OP)

## 2024-12-09 PROCEDURE — A9270 NON-COVERED ITEM OR SERVICE: HCPCS | Performed by: STUDENT IN AN ORGANIZED HEALTH CARE EDUCATION/TRAINING PROGRAM

## 2024-12-09 PROCEDURE — 700102 HCHG RX REV CODE 250 W/ 637 OVERRIDE(OP): Performed by: NURSE PRACTITIONER

## 2024-12-09 PROCEDURE — RXMED WILLOW AMBULATORY MEDICATION CHARGE: Performed by: STUDENT IN AN ORGANIZED HEALTH CARE EDUCATION/TRAINING PROGRAM

## 2024-12-09 RX ORDER — OXYCODONE HYDROCHLORIDE 5 MG/1
5 TABLET ORAL EVERY 4 HOURS PRN
Qty: 30 TABLET | Refills: 0 | Status: SHIPPED | OUTPATIENT
Start: 2024-12-09 | End: 2024-12-29

## 2024-12-09 RX ORDER — ACETAMINOPHEN 500 MG
TABLET ORAL
Qty: 30 TABLET | Refills: 0 | Status: SHIPPED | OUTPATIENT
Start: 2024-12-09 | End: 2025-01-18

## 2024-12-09 RX ORDER — NIFEDIPINE 30 MG/1
30 TABLET, EXTENDED RELEASE ORAL DAILY
Qty: 30 TABLET | Refills: 1 | Status: SHIPPED | OUTPATIENT
Start: 2024-12-10

## 2024-12-09 RX ORDER — LABETALOL 300 MG/1
300 TABLET, FILM COATED ORAL EVERY 8 HOURS
Qty: 120 TABLET | Refills: 0 | Status: SHIPPED | OUTPATIENT
Start: 2024-12-09

## 2024-12-09 RX ORDER — PSEUDOEPHEDRINE HCL 30 MG
100 TABLET ORAL 2 TIMES DAILY PRN
Qty: 60 CAPSULE | Refills: 0 | Status: SHIPPED | OUTPATIENT
Start: 2024-12-09

## 2024-12-09 RX ADMIN — ACETAMINOPHEN 1000 MG: 500 TABLET, FILM COATED ORAL at 05:38

## 2024-12-09 RX ADMIN — NIFEDIPINE 30 MG: 30 TABLET, FILM COATED, EXTENDED RELEASE ORAL at 07:01

## 2024-12-09 RX ADMIN — ANTACID TABLETS 500 MG: 500 TABLET, CHEWABLE ORAL at 07:01

## 2024-12-09 RX ADMIN — LABETALOL HYDROCHLORIDE 300 MG: 300 TABLET, FILM COATED ORAL at 07:01

## 2024-12-09 RX ADMIN — POLYETHYLENE GLYCOL 3350 1 PACKET: 17 POWDER, FOR SOLUTION ORAL at 07:00

## 2024-12-09 ASSESSMENT — EDINBURGH POSTNATAL DEPRESSION SCALE (EPDS)
THINGS HAVE BEEN GETTING ON TOP OF ME: NO, I HAVE BEEN COPING AS WELL AS EVER
I HAVE BEEN ANXIOUS OR WORRIED FOR NO GOOD REASON: NO, NOT AT ALL
I HAVE BLAMED MYSELF UNNECESSARILY WHEN THINGS WENT WRONG: NO, NEVER
THE THOUGHT OF HARMING MYSELF HAS OCCURRED TO ME: NEVER
I HAVE FELT SAD OR MISERABLE: NO, NOT AT ALL
I HAVE BEEN ABLE TO LAUGH AND SEE THE FUNNY SIDE OF THINGS: AS MUCH AS I ALWAYS COULD
I HAVE BEEN SO UNHAPPY THAT I HAVE BEEN CRYING: NO, NEVER
I HAVE LOOKED FORWARD WITH ENJOYMENT TO THINGS: AS MUCH AS I EVER DID
I HAVE FELT SCARED OR PANICKY FOR NO GOOD REASON: NO, NOT AT ALL
I HAVE BEEN SO UNHAPPY THAT I HAVE HAD DIFFICULTY SLEEPING: NOT AT ALL

## 2024-12-09 NOTE — PROGRESS NOTES
A bedside report received from Claudia MCCLELLAND @ 0700.  Assumed care. Discussed plan of care. Assessment done. The patient would like to go home ASAP. This RN coordinated with [facility] to possibly prioritize  the patient's home meds. Discharge instruction discussed. She is doing well. Will discharge as soon as the patient is ready. Call light is within reach.

## 2024-12-09 NOTE — DISCHARGE SUMMARY
Discharge Summary      29 y.o.  s/p  for fetal distress after IOL for preeclampsia with severe features.  Delivery date: 2024. On her anticipated day of discharge her blood pressure were elevated therefore she was kept an extra day in order to titrate BP medication. Nifedipine 30mgXL was added and labetalol increased to 300mg TID from 200mg BID. Blood pressures improved following medication increase.      Patient started IOL for preeclampsia with severe features on  24.  She underwent multiple cervical ripening attempts with Cytotec and Cervidil.  She was started on magnesium, which was discontinued at [facility] recommendation after blood pressures normalized during labor and patient had no signs or symptoms of preeclampsia at that time.  Patient did develop decreasing variability and late decelerations associated with tachysystole and had toco lysis with terbutaline.   delivery was recommended at that time she underwent  delivery for fetal intolerance of labor without complications.      Subjective  Pt reports she wants to discharge today if possible. She is doing much better than yesterday. Baby remains in the NICU.     Pain: Yes,  controlled  Bleeding: lochia less than regular menstrual bleeding  Tolerating PO: Yes  Voiding: without difficulty  Ambulating: yes  Passing flatus: Yes  Feeding: breastfeeding pumping milk for baby in NICU     Objective  Vitals:    24 0540   BP: (!) 144/91   Pulse: 79   Resp: 18   Temp: 37 °C (98.6 °F)   SpO2: 95%          Physical Exam  Gen: well and resting  CV: RRR, nl S1 and S2, no murmur  Resp: unlabored respirations, no intercostal retractions or accessory muscle use, clear to auscultation without rales or wheezes  Chest/Breasts: exam deferred  Abd: nontender, soft  Fundus: firm, below umbilicus, and nontender  Incision: dressing clean, dry, intact  Perineum: deferred  Ext: symmetric and 1+ edema, calves nontender     Labs:  Recent  Results         VTE prophylaxis: patient ambulating     - Disposition: Anticipate discharge home on PPD 3-4 if BP stable.    Admit Date:   12/4/2024    Discharge Date:  12/9/2024     Medications:   Current Facility-Administered Medications Ordered in Epic   Medication Dose Route Frequency Provider Last Rate Last Admin    NIFEdipine SR (Procadia-XL) tablet 30 mg  30 mg Oral Q DAY Cara Payan D.O.   30 mg at 12/09/24 0701    labetalol (Normodyne) tablet 300 mg  300 mg Oral Q8HRS Cara Payan D.O.   300 mg at 12/09/24 0701    lactated ringers infusion  2,000 mL Intravenous PRN Pita Adhikari M.D.        acetaminophen (Tylenol) tablet 1,000 mg  1,000 mg Oral Q6HRS Pita Adhikari M.D.   1,000 mg at 12/09/24 0538    Followed by    [START ON 12/10/2024] acetaminophen (Tylenol) tablet 1,000 mg  1,000 mg Oral Q6HRS PRN Pita Adhikari M.D.        oxyCODONE immediate-release (Roxicodone) tablet 5 mg  5 mg Oral Q4HRS PRN Pita Adhikari M.D.   5 mg at 12/08/24 0346    oxyCODONE immediate-release (Roxicodone) tablet 10 mg  10 mg Oral Q4HRS PRN Pita Adhikari M.D.   10 mg at 12/07/24 2128    ondansetron (Zofran) syringe/vial injection 4 mg  4 mg Intravenous Q6HRS PRN Pita Adhikari M.D.        Or    ondansetron (Zofran ODT) dispertab 4 mg  4 mg Oral Q6HRS PRN Pita Adhikari M.D.        diphenhydrAMINE (Benadryl) tablet/capsule 25 mg  25 mg Oral Q6HRS PRALMA Adhikari M.D.        Or    diphenhydrAMINE (Benadryl) injection 25 mg  25 mg Intravenous Q6HRS PRN Pita Adhikari M.D.        docusate sodium (Colace) capsule 100 mg  100 mg Oral BID PRN Pita Adhikari M.D.        calcium GLUConate injection 1 g  1 g Intravenous Once PRN Pita Adhikari M.D.        polyethylene glycol/lytes (Miralax) Packet 1 Packet  1 Packet Oral DAILY Pita Adhikari M.D.   1 Packet at 12/09/24 0700    calcium carbonate (Tums) chewable tab 500 mg  500 mg Oral DAILY MONI Vega   500 mg at 12/09/24 0701    LR  infusion   Intravenous Continuous Shonda Vazquez M.D.   Stopped at 24 0900    oxytocin (Pitocin) infusion (for post delivery)  125 mL/hr Intravenous Continuous Shonda Vazquez M.D.   Held at 24 1330     Current Outpatient Medications Ordered in Epic   Medication Sig Dispense Refill    acetaminophen (TYLENOL) 500 MG Tab Take 2 Tablets by mouth every 6 hours for 10 days, THEN 2 Tablets every 6 hours as needed for Moderate Pain or Mild Pain for up to 30 days. 30 Tablet 0    docusate sodium 100 MG Cap Take 100 mg by mouth 2 times a day as needed for Constipation. 60 Capsule 0    labetalol (NORMODYNE) 300 MG Tab Take 1 Tablet by mouth every 8 hours. 120 Tablet 0    [START ON 12/10/2024] NIFEdipine SR (PROCADIA-XL) 30 MG tablet Take 1 Tablet by mouth every day. 30 Tablet 1    oxyCODONE immediate-release (ROXICODONE) 5 MG Tab Take 1 Tablet by mouth every four hours as needed for Severe Pain for up to 20 days. 30 Tablet 0         Activity:   Discharge to home  Pelvic Rest x 6 weeks       Assessment/Plan  Quoc Horan is a 29 y.o.yo  postpartum day #3 s/p  for fetal distress delivery following IOL for pre-e. Doing well.      - Post care: meeting all goals  - patient to follow up for a B check in clinic in 2 days and 1 weeks for incision check  - Pain: controlled   - Rh+, Rubella Immune  - Method of Feeding: plans to breastfeed  - Method of Contraception: undecided  Follow up: [facility] or [facility] in 5 weeks for vaginal ; 1 week for incision check.        Medication List        START taking these medications        Instructions   acetaminophen 500 MG Tabs  Start taking on: 2024  Commonly known as: Tylenol   Take 2 Tablets by mouth every 6 hours for 10 days, THEN 2 Tablets every 6 hours as needed for Moderate Pain or Mild Pain for up to 30 days.     docusate sodium 100 MG Caps   Take 100 mg by mouth 2 times a day as needed for Constipation.  Dose: 100 mg     labetalol  300 MG Tabs  Commonly known as: Normodyne   Take 1 Tablet by mouth every 8 hours.  Dose: 300 mg     NIFEdipine SR 30 MG tablet  Start taking on: December 10, 2024  Commonly known as: Procadia-XL   Take 1 Tablet by mouth every day.  Dose: 30 mg     oxyCODONE immediate-release 5 MG Tabs  Commonly known as: Roxicodone   Take 1 Tablet by mouth every four hours as needed for Severe Pain for up to 20 days.  Dose: 5 mg            CONTINUE taking these medications        Instructions   PRENATAL 1 + IRON PO   Take  by mouth.            Hilary Cardenas DO, FACOG  [facility]

## 2024-12-09 NOTE — LACTATION NOTE
This note was copied from a baby's chart.  Initial Consult:     History:  MOB, Quoc, is a 30 y/o,  s/p PC/S at 32+5 wks on 2024 at 0722. CHTN with superimposed pre-eclampsia with severe features. C/S for NRFHT following attempted IOL.    Baby boy, Luis, is in the NICU for prematurity.     History of BF:  Primip. Planned to formula feed, but is pumping for NICU baby.     Report of Current Breastfeeding Status:  Quoc has been pumping using HG pump with 22.5 flanges and 30% suction. Yesterday she was yielding 20 mL. She did not pump overnight, and this morning is yielding droplets. She has purchased Spectra pump for home use. She denies breast/nipple discomfort and reports pumping is comfortable.    Breastfeeding Assistance:     Reviewed and reinforced hand expression. Quoc reports she is able to hand express colostrum. Encouraged several minutes hand expression before and after pumping.     Discussed supply and demand. Recommended q3h or 8-10x/24h, with 1-2 pumping sessions in the night.     Discussed HG pump rental recommendation.     Provided scent hearts and discussed use.     Will place a second pump kit with 22.5 mm flanges so Quoc may pump at baby's bedside. Encouraged to ask for skin-to-skin time with baby when baby is well enough.     Encouraged MOB watch [facility] hand-expression video and hands-on pumping video.     [facility] Breastfeeding Resources handout provided and outpatient lactation care and support [place] options reviewed.     Provided manual breast pump in case of winter power outages.     Plan:     Pump breasts q3h x 15 mins with hospital grade breast pump. Settings: 80 cpm x 2 mins, 60 cpm for remainder. Suction 20%-40% or to comfort.    Gentle massage and hand expression before and after pumping.     Skin-to-skin with baby as much as possible in NICU.    Watch [facility] hand-expression video and hands-on pumping video.

## 2024-12-09 NOTE — DISCHARGE INSTRUCTIONS
PATIENT DISCHARGE EDUCATION INSTRUCTION SHEET    REASONS TO CALL YOUR OBSTETRICIAN  Persistent fever, shaking, chills (Temperature higher than 100.4) may indicate you have an infection  Heavy bleeding: soaking more than 1 pad per hour; Passing clots an egg-sized clot or bigger may mean you have an postpartum hemorrhage  Foul odor from vagina or bad smelling or discolored discharge or blood  Breast infection (Mastitis symptoms); breast pain, chills, fever, redness or red streaks, may feel flu like symptoms  Urinary pain, burning or frequency  Incision that is not healing, increased redness, swelling, tenderness or pain, or any pus from episiotomy or  site may mean you have an infection  Redness, swelling, warmth, or painful to touch in the calf area of your leg may mean you have a blood clot  Severe or intensified depression, thoughts or feelings of wanting to hurt yourself or someone else   Pain in chest, obstructed breathing or shortness of breath (trouble catching your breath) may mean you are having a postpartum complication. Call your provider immediately   Headache that does not get better, even after taking medicine, a bad headache with vision changes or pain in the upper right area of your belly may mean you have high blood pressure or post birth preeclampsia. Call your provider immediately    HAND WASHING  All family and friends should wash their hands:  Before and after holding the baby  Before feeding the baby  After using the restroom or changing the baby's diaper    WOUND CARE  Ask your physician for additional care instructions. In general:   Incision:  May shower and pat incision dry   Keep the incision clean and dry  There should not be any opening or pus from the incision  Continue to walk at home 3 times a day   Do NOT lift anything heavier than your baby (over 10 pounds)  Encourage family to help participate in care of the  to allow rest and mom time to heal  Continue to  "use eugenio-bottle until bleeding stops as needed    VAGINAL CARE AND BLEEDING  Nothing inside vagina for 6 weeks:   No sexual intercourse, tampons or douching  Bleeding may continue for 2-4 weeks. Amount and color may vary  Soaking 1 pad or more in an hour for several hours is considered heavy bleeding  Passing large egg sized blood clots can be concerning  If you feel like you have heavy bleeding or are having increasing amount of blood clots call your Obstetrician immediately  If you begin feeling faint upon standing, feeling sick to your stomach, have clammy skin, a really fast heartbeat, have chills, start feeling confused, dizzy, sleepy or weak, or feeling like you're going to faint call your Obstetrician immediately    HYPERTENSION   Preeclampsia or gestational hypertension are types of high blood pressure that only pregnant women can get. It is important for you to be aware of symptoms to seek early intervention and treatment. If you have any of these symptoms immediately call your Obstetrician    Vision changes or blurred vision   Severe headache or pain that is unrelieved with medication and will not go away  Persistent pain in upper abdomen or shoulder   Increased swelling of face, feet, or hands  Difficulty breathing or shortness of breath at rest  Urinating less than usual    URINATION AND BOWEL MOVEMENTS  Eating more fiber (bran cereal, fruits, and vegetables) and drinking plenty of fluids will help to avoid constipation  Urinary frequency and urgency after childbirth is normal  If you experience any urinary pain, burning or frequency call your provider    BIRTH CONTROL  It is possible to become pregnant at any time after delivery and while breastfeeding  Plan to discuss a method of birth control with your physician at your post delivery follow up visit    POSTPARTUM BLUES  During the first few days after birth, you may experience a sense of the \"blues\" which may include impatience, irritability or even " "crying. These feelings come and go quickly. However, as many as 1 in 10 women experience emotional symptoms known as postpartum depression.     POSTPARTUM DEPRESSION    May start as early as the second or third day after delivery or take several weeks or months to develop. Symptoms of \"blues\" are present, but are more intense: Crying spells; loss of appetite; feelings of hopelessness or loss of control; fear of touching the baby; over concern or no concern at all about the baby; little or no concern about your own appearance/caring for yourself; and/or inability to sleep or excessive sleeping. Contact your Obstetrician if you are experiencing any of these symptoms     PREVENTING SHAKEN BABY  If you are angry or stressed, PUT THE BABY IN THE CRIB, step away, take some deep breaths, and wait until you are calm to care for the baby. DO NOT SHAKE THE BABY. You are not alone, call a supporter for help.  [facility] 24/7 crisis call line (598-682-4384) or (1-160.335.5517)  You can also text them, text \"ANSWER\" (650408)    Pumping Discharge Plan:     Pump breasts q3h x 15 mins with hospital grade breast pump. Settings for [facility]meda: 80 cpm x 2 mins, 60 cpm for remainder. Suction 20%-40% or to comfort.    Gentle massage and hand expression before and after pumping.     Skin-to-skin with baby as much as possible in NICU.    Watch [facility] hand-expression video and hands-on pumping video.     Ask baby's bedside RN for an appointment to see lactation if you have breastfeeding/pumping questions while baby is still admitted.    Engorgement care: keep to an every 3 hour pumping schedule, gentle (like petting a cat) massage towards axilla, cool compresses, hand express to comfort and to soften nipple area, NSAIDs (like ibuprofen) as prescribed by OB for postpartum pain relief. Contact your OB provider if you develop a hard, warm, reddened lump especially if you also have a fever, chills, aches as this is " concerning for mastitis.      Create a comfortable and relaxing environment for pumping. It's great to pump in NICU after cuddling/spending time with your baby!    Breastfeeding/pumping support is available!      [facility] holds a free Breastfeeding [place] every Thursday from 11am-12pm lead by a Lactation Consultant.  No appointment necessary.  Excludes holidays. Bring your baby and/or pump!  Location: [facility] in [facility]  3rd floor conference room.

## 2024-12-09 NOTE — TELEPHONE ENCOUNTER
Phone Number Called: [phone]    Call outcome: Left detailed message for patient. Informed to call back with any additional questions.    Message: Per  I scheduled this pt a BP check fr 12/12/24 @9:30am. I had to LVM with pt letting her know I scheduled this and if the apt time/day does not work for her to reach out to us and let us know so we can r/s her. Call ended.

## 2024-12-09 NOTE — CARE PLAN
Problem: Knowledge Deficit - Postpartum  Goal: Patient will verbalize and demonstrate understanding of self and infant care  Outcome: Progressing     Problem: Altered Physiologic Condition  Goal: Patient physiologically stable as evidenced by normal lochia, palpable uterine involution and vitals within normal limits  Outcome: Progressing   The patient is Stable - Low risk of patient condition declining or worsening    Shift Goals  Clinical Goals: stable vs and lochia wnl  Patient Goals: pain control, rest  Family Goals: support    Progress made toward(s) clinical / shift goals:    Pt reports comfort after pain interventions, Fundus firm and lochia light, VSS, educated on POC, needs met at this time. Questions answered. Will continue to educate.

## 2024-12-12 ENCOUNTER — OFFICE VISIT (OUTPATIENT)
Dept: OBGYN | Facility: CLINIC | Age: 29
End: 2024-12-12
Payer: OTHER GOVERNMENT

## 2024-12-12 VITALS — BODY MASS INDEX: 27.96 KG/M2 | SYSTOLIC BLOOD PRESSURE: 136 MMHG | DIASTOLIC BLOOD PRESSURE: 76 MMHG | WEIGHT: 148 LBS

## 2024-12-12 DIAGNOSIS — Z09 SURGERY FOLLOW-UP: ICD-10-CM

## 2024-12-12 PROCEDURE — 3078F DIAST BP <80 MM HG: CPT | Performed by: OBSTETRICS & GYNECOLOGY

## 2024-12-12 PROCEDURE — 99024 POSTOP FOLLOW-UP VISIT: CPT | Performed by: OBSTETRICS & GYNECOLOGY

## 2024-12-12 PROCEDURE — 3075F SYST BP GE 130 - 139MM HG: CPT | Performed by: OBSTETRICS & GYNECOLOGY

## 2024-12-12 ASSESSMENT — FIBROSIS 4 INDEX: FIB4 SCORE: 0.77

## 2024-12-12 NOTE — PROGRESS NOTES
Incision Check     CC: Incision Check     HPI: 29 y.o.  s/p  delivery on 2024 with fetal intolerance to labor during induction of labor secondary to preeclampsia.  Presents today for routine incision check  Pt reports doing well.  She denies any signs or symptoms of preeclampsia and is normotensive today.  Denies fevers/chills.  Denies incisional redness/pain or drainage.  Normal lochia.    The infant is in the NICU for prematurity.     Denies concerns about mood.      Vitals:    24 1003   BP: 136/76      Gen: AAO, NAD  Abd: soft, NT, ND,   Incision C/D/I, healing well     A/P: 29 y.o.  s/p LTCS  - no signs of postop complications  - encouraged BFing, lactation visit prn  - no signs of PP depression     RTC for routine postpartum at 6wks PP    Efrain Ayala M.D.

## 2024-12-27 ENCOUNTER — LACTATION ENCOUNTER (OUTPATIENT)
Dept: OTHER | Facility: MEDICAL CENTER | Age: 29
End: 2024-12-27

## 2024-12-27 NOTE — LACTATION NOTE
This note was copied from a baby's chart.  Lactation Consult:    Appointment purpose: First latch.    Baby awake bassinet.  Quoc BATES provided cares and placed baby skin to skin into cross cradle on right breast.  Provided pillows to support proper positioning.  Adjusted MOB hands to provide infant's ear, shoulder, hip alignment. Taught hand expression.  MOB hand expressed colostrum onto upper lip.  Baby attempted to latch several times, but unable to sustain.  Attempted with 24mm nipple shield and baby became frustrated.  MOB requested no further attempts.  Dyad remained skin to skin.     Discussed option to continue to attempt latch if desired, MOB states she is not interested in further latch attempts at this time.      Lactation available as needed.

## 2025-01-14 ASSESSMENT — EDINBURGH POSTNATAL DEPRESSION SCALE (EPDS)
TOTAL SCORE: 0
I HAVE LOOKED FORWARD WITH ENJOYMENT TO THINGS: AS MUCH AS I EVER DID
I HAVE BEEN SO UNHAPPY THAT I HAVE HAD DIFFICULTY SLEEPING: NOT AT ALL
I HAVE BEEN SO UNHAPPY THAT I HAVE BEEN CRYING: NO, NEVER
I HAVE BEEN ANXIOUS OR WORRIED FOR NO GOOD REASON: NO, NOT AT ALL
THINGS HAVE BEEN GETTING ON TOP OF ME: NO, I HAVE BEEN COPING AS WELL AS EVER
I HAVE BEEN ABLE TO LAUGH AND SEE THE FUNNY SIDE OF THINGS: AS MUCH AS I ALWAYS COULD
I HAVE FELT SAD OR MISERABLE: NO, NOT AT ALL
THE THOUGHT OF HARMING MYSELF HAS OCCURRED TO ME: NEVER
I HAVE BLAMED MYSELF UNNECESSARILY WHEN THINGS WENT WRONG: NO, NEVER
I HAVE FELT SCARED OR PANICKY FOR NO GOOD REASON: NO, NOT AT ALL

## 2025-01-17 ENCOUNTER — APPOINTMENT (OUTPATIENT)
Dept: OBGYN | Facility: CLINIC | Age: 30
End: 2025-01-17
Payer: OTHER GOVERNMENT

## 2025-01-17 VITALS — BODY MASS INDEX: 26.45 KG/M2 | SYSTOLIC BLOOD PRESSURE: 144 MMHG | DIASTOLIC BLOOD PRESSURE: 82 MMHG | WEIGHT: 140 LBS

## 2025-01-17 PROCEDURE — 0503F POSTPARTUM CARE VISIT: CPT | Performed by: OBSTETRICS & GYNECOLOGY

## 2025-01-17 PROCEDURE — 3077F SYST BP >= 140 MM HG: CPT | Performed by: OBSTETRICS & GYNECOLOGY

## 2025-01-17 PROCEDURE — 3079F DIAST BP 80-89 MM HG: CPT | Performed by: OBSTETRICS & GYNECOLOGY

## 2025-01-17 ASSESSMENT — EDINBURGH POSTNATAL DEPRESSION SCALE (EPDS)
I HAVE FELT SCARED OR PANICKY FOR NO GOOD REASON: NO, NOT AT ALL
THE THOUGHT OF HARMING MYSELF HAS OCCURRED TO ME: NEVER
I HAVE BLAMED MYSELF UNNECESSARILY WHEN THINGS WENT WRONG: NO, NEVER
I HAVE FELT SAD OR MISERABLE: NO, NOT AT ALL
I HAVE BEEN ABLE TO LAUGH AND SEE THE FUNNY SIDE OF THINGS: AS MUCH AS I ALWAYS COULD
THINGS HAVE BEEN GETTING ON TOP OF ME: NO, I HAVE BEEN COPING AS WELL AS EVER
I HAVE BEEN ANXIOUS OR WORRIED FOR NO GOOD REASON: NO, NOT AT ALL
I HAVE BEEN SO UNHAPPY THAT I HAVE HAD DIFFICULTY SLEEPING: NOT AT ALL
I HAVE LOOKED FORWARD WITH ENJOYMENT TO THINGS: AS MUCH AS I EVER DID
I HAVE BEEN SO UNHAPPY THAT I HAVE BEEN CRYING: NO, NEVER
TOTAL SCORE: 0

## 2025-01-17 ASSESSMENT — FIBROSIS 4 INDEX: FIB4 SCORE: 0.77

## 2025-01-17 NOTE — PROGRESS NOTES
Postpartum;    Quoc Manriquez Horan is 29 y.o. female  status post primary LTCS on 2024 for NRFHR Tracing, doing well today. Currently nursing without difficulty    Physical examination;  Vital signs-blood pressure 144/82  Breast examination-no dominant masses, no skin retraction, no axillary adenopathy, no evidence of mastitis    Pelvic examination;  External genitalia-no visible lesions  Vagina-no discharge or blood  Cervix-no gross lesions  Uterus-normal size and shape, nontender  Adnexa without mass or tenderness     Abdomen-incision healing well-nondistended positive bowel sounds soft nontender without masses or hepatosplenomegaly      Impression;  Normal postpartum  Hypertension    Plan;  Patient was sent home from the hospital on Procardia and this was discontinued by her staff  Patient needs to follow-up with primary care within 1 to 2 weeks for long-term follow-up of elevated blood pressure may need to be on medication  Birth control condoms  Annual-1 year

## 2025-04-21 ENCOUNTER — OFFICE VISIT (OUTPATIENT)
Dept: URGENT CARE | Facility: PHYSICIAN GROUP | Age: 30
End: 2025-04-21
Payer: OTHER GOVERNMENT

## 2025-04-21 VITALS
OXYGEN SATURATION: 99 % | SYSTOLIC BLOOD PRESSURE: 126 MMHG | BODY MASS INDEX: 25.39 KG/M2 | RESPIRATION RATE: 17 BRPM | WEIGHT: 134.48 LBS | DIASTOLIC BLOOD PRESSURE: 86 MMHG | HEIGHT: 61 IN | HEART RATE: 95 BPM | TEMPERATURE: 97.5 F

## 2025-04-21 DIAGNOSIS — J02.9 SORE THROAT: ICD-10-CM

## 2025-04-21 DIAGNOSIS — R09.81 SINUS CONGESTION: ICD-10-CM

## 2025-04-21 DIAGNOSIS — J06.9 VIRAL UPPER RESPIRATORY TRACT INFECTION WITH COUGH: ICD-10-CM

## 2025-04-21 PROCEDURE — 3079F DIAST BP 80-89 MM HG: CPT

## 2025-04-21 PROCEDURE — 3074F SYST BP LT 130 MM HG: CPT

## 2025-04-21 PROCEDURE — 99214 OFFICE O/P EST MOD 30 MIN: CPT

## 2025-04-21 RX ORDER — BENZONATATE 100 MG/1
100 CAPSULE ORAL 3 TIMES DAILY PRN
Qty: 60 CAPSULE | Refills: 0 | Status: SHIPPED | OUTPATIENT
Start: 2025-04-21

## 2025-04-21 RX ORDER — AZITHROMYCIN 250 MG/1
250 TABLET, FILM COATED ORAL DAILY
Qty: 5 TABLET | Refills: 0 | Status: SHIPPED | OUTPATIENT
Start: 2025-04-21 | End: 2025-04-26

## 2025-04-21 RX ORDER — FLUTICASONE PROPIONATE 50 MCG
1 SPRAY, SUSPENSION (ML) NASAL DAILY
Qty: 16 G | Refills: 0 | Status: SHIPPED | OUTPATIENT
Start: 2025-04-21

## 2025-04-21 ASSESSMENT — ENCOUNTER SYMPTOMS
FEVER: 0
COUGH: 1
BRUISES/BLEEDS EASILY: 0
CONSTIPATION: 0
DIARRHEA: 0
SORE THROAT: 1
BLOOD IN STOOL: 0
EYE DISCHARGE: 0
SINUS PAIN: 1
WHEEZING: 0
VOMITING: 0
EYE REDNESS: 0

## 2025-04-21 ASSESSMENT — FIBROSIS 4 INDEX: FIB4 SCORE: 0.77

## 2025-04-22 NOTE — PROGRESS NOTES
Subjective:     Quoc Horan is a 29 y.o. female who presents for Pharyngitis (Sore throat x 1 week. Along with cough.)      Pharyngitis   This is a new problem. The current episode started in the past 7 days. There has been no fever. Associated symptoms include congestion and coughing. Pertinent negatives include no diarrhea, ear discharge or vomiting.       Review of Systems   Constitutional:  Negative for fever.   HENT:  Positive for congestion, sinus pain and sore throat. Negative for ear discharge.    Eyes:  Negative for discharge and redness.   Respiratory:  Positive for cough. Negative for wheezing.    Gastrointestinal:  Negative for blood in stool, constipation, diarrhea and vomiting.   Genitourinary:  Negative for frequency and hematuria.   Skin:  Negative for itching and rash.   Endo/Heme/Allergies:  Does not bruise/bleed easily.        CURRENT MEDICATIONS:  docusate sodium Caps  labetalol Tabs  NIFEdipine SR  PRENATAL 1 + IRON PO    Allergies:     Allergies   Allergen Reactions    Ibuprofen Hives and Swelling       Current Problems: Quoc Horan does not have any pertinent problems on file.  Past Surgical Hx:    Past Surgical History:   Procedure Laterality Date    PRIMARY C SECTION N/A 2024    Procedure:  SECTION, PRIMARY;  Surgeon: Pita Adhikari M.D.;  Location[facility];  Service: Obstetrics      Past Social Hx:  reports that she has never smoked. She has never used smokeless tobacco. She reports that she does not drink alcohol and does not use drugs.   Past Family Hx:  Quoc Horan family history includes No Known Problems in her mother.     (Allergies, Medications, & Tobacco/Substance Use were reconciled by the Medical Assistant and reviewed by myself. The family history is prepopulated)       Objective:     /86 (BP Location: Left arm, Patient Position: Sitting, BP Cuff Size: Adult long)   Pulse 95   Temp 36.4 °C (97.5 °F)  "(Temporal)   Resp 17   Ht 1.549 m (5' 1\")   Wt 61 kg (134 lb 7.7 oz)   SpO2 99%   Breastfeeding No   BMI 25.41 kg/m²     Physical Exam  Constitutional:       General: She is not in acute distress.     Appearance: Normal appearance. She is not ill-appearing, toxic-appearing or diaphoretic.   HENT:      Head: Normocephalic and atraumatic.      Nose: Congestion and rhinorrhea present.      Mouth/Throat:      Pharynx: Posterior oropharyngeal erythema present. No oropharyngeal exudate.   Eyes:      General: No scleral icterus.        Right eye: No discharge.         Left eye: No discharge.   Cardiovascular:      Rate and Rhythm: Normal rate and regular rhythm.      Heart sounds: Normal heart sounds. No murmur heard.     No friction rub. No gallop.   Pulmonary:      Effort: Pulmonary effort is normal. No respiratory distress.      Breath sounds: No stridor. No wheezing, rhonchi or rales.   Chest:      Chest wall: No tenderness.   Abdominal:      General: Abdomen is flat.      Palpations: Abdomen is soft.   Musculoskeletal:         General: Normal range of motion.      Cervical back: No rigidity.   Skin:     General: Skin is warm and dry.   Neurological:      General: No focal deficit present.      Mental Status: She is alert and oriented to person, place, and time. Mental status is at baseline.   Psychiatric:         Behavior: Behavior normal.         Judgment: Judgment normal.         Assessment/Plan:     Quoc was seen today for pharyngitis.    Diagnoses and all orders for this visit:    Sore throat  -     azithromycin (ZITHROMAX) 250 MG Tab; Take 1 Tablet by mouth every day for 5 days.    Viral upper respiratory tract infection with cough  -     benzonatate (TESSALON) 100 MG Cap; Take 1 Capsule by mouth 3 times a day as needed for Cough.    Sinus congestion  -     fluticasone (FLONASE) 50 MCG/ACT nasal spray; Administer 1 Spray into affected nostril(S) every day.  -     azithromycin (ZITHROMAX) 250 MG Tab; Take " 1 Tablet by mouth every day for 5 days.    Based on history of presenting illness, review of systems and physical exam findings, most likely etiology of subacute cough and congestion is viral upper respiratory infection complicated by acute bacterial sinus infection; discussed symptomatic management with pharmacotherapy and over-the-counter medications.  On my exam I do  see  signs /symptoms consistent with a bacterial infection currently requiring oral antibiotics.  Discussed the risks the benefits and the indications of all new medications prescribed today.  Strict return and ED precautions were discussed and all questions were answered.        Differential diagnosis, natural history, supportive care, and indications for immediate follow-up discussed.    Advised the patient to follow-up with the primary care physician for recheck, reevaluation, and consideration of further management.    Please note that this dictation was created using voice recognition software. I have made reasonable attempt to correct obvious errors, but I expect that there are errors of grammar and possibly content that I did not discover before finalizing the note.    This note was electronically signed by Samantha Taylor MD PhD

## 2025-06-14 ENCOUNTER — HOSPITAL ENCOUNTER (OUTPATIENT)
Dept: RADIOLOGY | Facility: MEDICAL CENTER | Age: 30
End: 2025-06-14
Attending: PHYSICIAN ASSISTANT
Payer: OTHER GOVERNMENT

## 2025-06-14 ENCOUNTER — OFFICE VISIT (OUTPATIENT)
Dept: URGENT CARE | Facility: PHYSICIAN GROUP | Age: 30
End: 2025-06-14
Payer: OTHER GOVERNMENT

## 2025-06-14 VITALS
SYSTOLIC BLOOD PRESSURE: 130 MMHG | DIASTOLIC BLOOD PRESSURE: 80 MMHG | RESPIRATION RATE: 14 BRPM | OXYGEN SATURATION: 97 % | TEMPERATURE: 98.4 F | HEIGHT: 62 IN | HEART RATE: 88 BPM | BODY MASS INDEX: 23.92 KG/M2 | WEIGHT: 130 LBS

## 2025-06-14 DIAGNOSIS — S69.92XA WRIST INJURY, LEFT, INITIAL ENCOUNTER: ICD-10-CM

## 2025-06-14 DIAGNOSIS — M65.4 DE QUERVAIN'S DISEASE (RADIAL STYLOID TENOSYNOVITIS): Primary | ICD-10-CM

## 2025-06-14 PROCEDURE — 3075F SYST BP GE 130 - 139MM HG: CPT | Performed by: PHYSICIAN ASSISTANT

## 2025-06-14 PROCEDURE — 73110 X-RAY EXAM OF WRIST: CPT | Mod: LT

## 2025-06-14 PROCEDURE — 3079F DIAST BP 80-89 MM HG: CPT | Performed by: PHYSICIAN ASSISTANT

## 2025-06-14 PROCEDURE — 99213 OFFICE O/P EST LOW 20 MIN: CPT | Performed by: PHYSICIAN ASSISTANT

## 2025-06-14 RX ORDER — METHYLPREDNISOLONE 4 MG/1
TABLET ORAL
Qty: 21 TABLET | Refills: 0 | Status: SHIPPED | OUTPATIENT
Start: 2025-06-14

## 2025-06-14 ASSESSMENT — FIBROSIS 4 INDEX: FIB4 SCORE: 0.8

## 2025-06-14 NOTE — PROGRESS NOTES
"Subjective     Quoc Mitra Horan is a 30 y.o. female who presents with Wrist Pain (Bump on wrist and not straight Xcouple months ago)    PMH:  has no past medical history on file.  MEDS: Current Medications[1]  ALLERGIES: Allergies[2]  SURGHX: Past Surgical History[3]  SOCHX:  reports that she has never smoked. She has never used smokeless tobacco. She reports that she does not drink alcohol and does not use drugs.  FH: Reviewed with patient, not pertinent to this visit.             Patient presents with left wrist pain and bump or crooked appearing wrist x 2 months.  Patient denies slip trip fall or trauma to her wrist, she is not sure what precipitated the pain in the bump.  Patient denies previous injury to this wrist.  Pain especially with lateral movements. No other complaints.           Wrist Injury         Review of Systems   Musculoskeletal:  Positive for joint pain (left wrist).   All other systems reviewed and are negative.             Objective     /80   Pulse 88   Temp 36.9 °C (98.4 °F) (Temporal)   Resp 14   Ht 1.57 m (5' 1.81\")   Wt 59 kg (130 lb)   SpO2 97%   BMI 23.92 kg/m²      Physical Exam  Vitals and nursing note reviewed.   Constitutional:       General: She is not in acute distress.     Appearance: Normal appearance. She is well-developed. She is not ill-appearing or toxic-appearing.   HENT:      Head: Normocephalic and atraumatic.      Right Ear: External ear normal.      Left Ear: External ear normal.      Nose: Nose normal.      Mouth/Throat:      Lips: Pink.      Mouth: Mucous membranes are moist.      Pharynx: Oropharynx is clear. Uvula midline.   Eyes:      Extraocular Movements: Extraocular movements intact.      Conjunctiva/sclera: Conjunctivae normal.      Pupils: Pupils are equal, round, and reactive to light.   Cardiovascular:      Rate and Rhythm: Normal rate and regular rhythm.      Pulses: Normal pulses.   Pulmonary:      Effort: Pulmonary effort is normal. "   Abdominal:      Palpations: Abdomen is soft.   Musculoskeletal:         General: Normal range of motion.      Left wrist: Bony tenderness present. No snuff box tenderness or crepitus. Normal range of motion. Normal pulse.        Hands:       Cervical back: Normal range of motion and neck supple.   Skin:     General: Skin is warm and dry.      Capillary Refill: Capillary refill takes less than 2 seconds.   Neurological:      General: No focal deficit present.      Mental Status: She is alert and oriented to person, place, and time.      Cranial Nerves: No cranial nerve deficit.      Motor: Motor function is intact.      Coordination: Coordination is intact.      Gait: Gait normal.   Psychiatric:         Mood and Affect: Mood normal.                                  Assessment & Plan  De Quervain's disease (radial styloid tenosynovitis)    Orders:    Referral to Orthopedics    methylPREDNISolone (MEDROL DOSEPAK) 4 MG Tablet Therapy Pack; Follow schedule on package instructions.    Wrist injury, left, initial encounter    Orders:    DX-WRIST-COMPLETE 3+ LEFT; Future    Referral to Orthopedics    methylPREDNISolone (MEDROL DOSEPAK) 4 MG Tablet Therapy Pack; Follow schedule on package instructions.       RADIOLOGY RESULTS   DX-WRIST-COMPLETE 3+ LEFT  Result Date: 6/14/2025 6/14/2025 2:58 PM HISTORY/REASON FOR EXAM:  Atraumatic Pain/Swelling/Deformity Left wrist pain. Trauma. TECHNIQUE/EXAM DESCRIPTION AND NUMBER OF VIEWS: 4 views of the LEFT wrist. COMPARISON: FINDINGS:  Bone mineralization is normal. There is no evidence of fracture or dislocation. Soft tissues are normal.     No evidence of acute fracture or dislocation.          PT xray negative for acute fx.     PT HPI and PE are consistent with  DeQuevain's Tenosynovitis.  Rx for medrol dose pack for acute inflammation.     OTC tylenol for pain.    Velcro wrist brace applied.     RICE TREATMENT FOR EXTREMITY INJURIES:  R-rest the extremity as much as possible  while pain and swelling persist  I-ice the extremity 15 minutes every 2 hours for the first 24 hours, then 4-5 times daily   C-compress the extremity either with splint or ace wrap as directed  E-elevate the extremity to help with swelling      Referral to ortho placed.      Differential diagnosis, supportive care, and indications for immediate follow-up discussed with patient.  Instructed to return to clinic or nearest emergency department for any change in condition, further concerns, or worsening of symptoms.    I personally reviewed prior external notes and test results pertinent to today's visit.  I have independently reviewed and interpreted all diagnostics ordered during this urgent care visit.    PT should follow up with PCP in 1-2 days for re-evaluation if symptoms have not improved.      Discussed red flags and reasons to return to UC or ED.      Pt and/or family verbalized understanding of diagnosis and follow up instructions and was offered informational handout on diagnosis.  PT discharged.     Please note that this dictation was created using voice recognition software. I have made every reasonable attempt to correct obvious errors, but I expect that there may be errors of grammar and possibly content that I did not discover before finalizing the note.                  [1]   Current Outpatient Medications:     methylPREDNISolone (MEDROL DOSEPAK) 4 MG Tablet Therapy Pack, Follow schedule on package instructions., Disp: 21 Tablet, Rfl: 0    fluticasone (FLONASE) 50 MCG/ACT nasal spray, Administer 1 Spray into affected nostril(S) every day., Disp: 16 g, Rfl: 0    benzonatate (TESSALON) 100 MG Cap, Take 1 Capsule by mouth 3 times a day as needed for Cough., Disp: 60 Capsule, Rfl: 0    docusate sodium 100 MG Cap, Take 1 capsule by mouth 2 times a day as needed for Constipation., Disp: 60 Capsule, Rfl: 0    labetalol (NORMODYNE) 300 MG Tab, Take 1 Tablet by mouth every 8 hours., Disp: 120 Tablet, Rfl: 0     NIFEdipine SR (PROCADIA-XL) 30 MG tablet, Take 1 Tablet by mouth every day., Disp: 30 Tablet, Rfl: 1    Prenatal Multivit-Min-Fe-FA (PRENATAL 1 + IRON PO), Take  by mouth., Disp: , Rfl:   [2]   Allergies  Allergen Reactions    Ibuprofen Hives and Swelling   [3]   Past Surgical History:  Procedure Laterality Date    PRIMARY C SECTION N/A 2024    Procedure:  SECTION, PRIMARY;  Surgeon: Pita Adhikari M.D.;  Location: [facility];  Service: Obstetrics

## 2025-06-20 NOTE — Clinical Note
REFERRAL APPROVAL NOTICE         Sent on June 20, 2025                   Quoc Horan  [address] [facility] [address]                   Dear Ms. Horan,    After a careful review of the medical information and benefit coverage, [facility] has processed your referral. See below for additional details.    If applicable, you must be actively enrolled with your insurance for coverage of the authorized service. If you have any questions regarding your coverage, please contact your insurance directly.    REFERRAL INFORMATION   Referral #:  [id number]  Referred-To Provider    Referred-By Provider:  Orthopedic Surgery    Luzma Carney P.A.-C.   [address]  [facility] [address]  [phone] [address]  [facility] [address]  [phone]    Referral Start Date:  06/14/2025  Referral End Date:   06/14/2026             SCHEDULING  If you do not already have an appointment, please call [phone] to make an appointment.     MORE INFORMATION  If you do not already have a [facility] account, sign up at: [email or web address]  You can access your medical information, make appointments, see lab results, billing information, and more.  If you have questions regarding this referral, please contact  the [facility] Referrals department at:             [phone]. Monday - Friday 8:00AM - 5:00PM.     Sincerely,    [facility]

## (undated) DEVICE — CLOSURE SKIN STRIP 1/2 X 4 IN - (STERI STRIP) (50/BX 4BX/CA)

## (undated) DEVICE — SUTURE 0 VICRYL PLUS CT-1 - 36 INCH (36/BX)

## (undated) DEVICE — DRESSING POST OP BORDER 4 X 10 (5EA/BX)

## (undated) DEVICE — CHLORAPREP 26 ML APPLICATOR - ORANGE TINT(25/CA)

## (undated) DEVICE — CANISTER SUCTION 3000ML MECHANICAL FILTER AUTO SHUTOFF MEDI-VAC NONSTERILE LF DISP (40EA/CA)

## (undated) DEVICE — TRAY SPINAL ANESTHESIA NON-SAFETY (10/CA)

## (undated) DEVICE — ELECTRODE DUAL RETURN W/ CORD - (50/PK)

## (undated) DEVICE — GLOVE BIOGEL INDICATOR SZ 7SURGICAL PF LTX - (50/BX 4BX/CA)

## (undated) DEVICE — SOLUTION PLASMA-LYTE PH 7.4 INJ 1000ML (14EA/CA)

## (undated) DEVICE — PAD LAP STERILE 18 X 18 - (5/PK 40PK/CA)

## (undated) DEVICE — WATER IRRIGATION STERILE 1000ML (12EA/CA)

## (undated) DEVICE — CANNULA O2 COMFORT SOFT EAR ADULT 7 FT TUBING (50/CA)

## (undated) DEVICE — PACK C-SECTION (2EA/CA)

## (undated) DEVICE — CATHETER IV NON-SAFETY 18 GA X 1 1/4 (50/BX 4BX/CA)

## (undated) DEVICE — BLANKET STERILE CHICKIE FOR L&D (100/CA)

## (undated) DEVICE — PENCIL ELECTSURG 10FT BTN SWH - (50/CA)

## (undated) DEVICE — CANISTER SUCTION 1200 CC MECHANICAL FILTER AUTO SHUT OFF MEDI-VAC STERILE LF DISP - (40EA/CA)

## (undated) DEVICE — GLOVE BIOGEL SZ 7 SURGICAL PF LTX - (50PR/BX 4BX/CA)

## (undated) DEVICE — BLANKET UNDERBODY ADULT - (10/CA)

## (undated) DEVICE — SUTURE 1 CHROMIC CTX ETHICON - (36PK/BX)

## (undated) DEVICE — SUTURE 4-0 MONOCRYL PLUS PS-2 - 27 INCH (36/BX)

## (undated) DEVICE — BLANKET UNDERBODY FULL ACCES - (5/CA)

## (undated) DEVICE — HEAD HOLDER JUNIOR/ADULT

## (undated) DEVICE — GLOVE BIOGEL ECLIPSE PF LATEX SIZE 6.0 (50PR/BX)

## (undated) DEVICE — TUBING CLEARLINK DUO-VENT - C-FLO (48EA/CA)

## (undated) DEVICE — SET EXTENSION WITH 2 PORTS (48EA/CA) ***PART #2C8610 IS A SUBSTITUTE*****

## (undated) DEVICE — KIT SKIN NOSE AND MOUTH PRE-OP (20/CA)

## (undated) DEVICE — SUTURE 1 CHROMIC CT-1 (36PK/BX)

## (undated) DEVICE — BAG SPONGE COUNT 10.25 X 32 - BLUE (250/CA)

## (undated) DEVICE — SODIUM CHL IRRIGATION 0.9% 1000ML (12EA/CA)

## (undated) DEVICE — KIT  I.V. START (100EA/CA)

## (undated) DEVICE — ADHESIVE MASTISOL - (48/BX)

## (undated) DEVICE — SUTURE 3-0 VICRYL PLUS CT-1 - 36 INCH (36/BX)

## (undated) DEVICE — SUTURE 0 VICRYL PLUS CT 36 (36PK/BX)"